# Patient Record
Sex: FEMALE | Race: WHITE | NOT HISPANIC OR LATINO | Employment: OTHER | URBAN - METROPOLITAN AREA
[De-identification: names, ages, dates, MRNs, and addresses within clinical notes are randomized per-mention and may not be internally consistent; named-entity substitution may affect disease eponyms.]

---

## 2017-01-03 ENCOUNTER — ALLSCRIPTS OFFICE VISIT (OUTPATIENT)
Dept: OTHER | Facility: OTHER | Age: 77
End: 2017-01-03

## 2017-07-12 ENCOUNTER — ALLSCRIPTS OFFICE VISIT (OUTPATIENT)
Dept: OTHER | Facility: OTHER | Age: 77
End: 2017-07-12

## 2017-12-13 ENCOUNTER — LAB REQUISITION (OUTPATIENT)
Dept: LAB | Facility: HOSPITAL | Age: 77
End: 2017-12-13
Payer: MEDICARE

## 2017-12-13 ENCOUNTER — ALLSCRIPTS OFFICE VISIT (OUTPATIENT)
Dept: OTHER | Facility: OTHER | Age: 77
End: 2017-12-13

## 2017-12-13 DIAGNOSIS — Z13.820 ENCOUNTER FOR SCREENING FOR OSTEOPOROSIS: ICD-10-CM

## 2017-12-13 DIAGNOSIS — Z01.419 ENCOUNTER FOR GYNECOLOGICAL EXAMINATION WITHOUT ABNORMAL FINDING: ICD-10-CM

## 2017-12-13 PROCEDURE — G0145 SCR C/V CYTO,THINLAYER,RESCR: HCPCS | Performed by: OBSTETRICS & GYNECOLOGY

## 2017-12-15 NOTE — PROGRESS NOTES
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)   · 2009; recurred 2016   3  Osteopenia (733 90) (M85 80)   4  Encounter for routine gynecological examination with Papanicolaou smear of cervix (V72 31,V76 2) (Z01 419)    Plan  Osteoporosis screening    · * DXA BONE DENSITY SPINE HIP AND PELVIS; Status:Hold For - Scheduling;Requested for:89Gyv2832;    Perform:[facility]; Due:65Pkh5894; Ordered;For:Osteoporosis screening; Ordered By:Patriarco, [place];    Discussion/Summary  Pap test was done today Breast cancer screening: breast cancer screening is managed by Dr Ezequiel Marinelli  Colorectal cancer screening: colorectal cancer screening is current  Osteoporosis screening: bone mineral density testing has been ordered  Chief Complaint  Patient presents today for her yearly exam       History of Present Illness  HPI: Hx Rt breast Ca x 2   S/P Rt mastectomyHx CIS Cx many years ago  Paps since then all normalNo c/o   GYN HM, Adult Female [facility]: The patient is being seen for a gynecology evaluation  The last health maintenance visit was 2 year(s) ago  General Health:  Reproductive health: the patient is postmenopausal    Screening:      Review of Systems   Constitutional: No fever, no chills, feels well, no tiredness, no recent weight gain or loss  Breasts: as noted in HPI,-- no breast pain,-- no breast swelling,-- no reddening of the breast,-- no breast lump,-- no breast itching-- and-- no nipple discharge  Gastrointestinal: no complaints of abdominal pain, no constipation, no nausea or diarrhea, no vomiting, no bloody stools  Genitourinary: no complaints of dysuria, no incontinence, no pelvic pain, no dysmenorrhea, no vaginal discharge or abnormal vaginal bleeding  Active Problems  1  Allergic rhinitis (477 9) (J30 9)   2  Breast neoplasm, Tis (DCIS), 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)   3  CIS (carcinoma in situ of cervix) (233 1) (D06 9)   4  Encounter for routine gynecological examination with Papanicolaou smear of cervix (V72 31,V76 2) (Z01 419)   5  Encounter for screening for malignant neoplasm of cervix (V76 2) (Z12 4)   6  Encounter for screening mammogram for malignant neoplasm of breast (V76 12) (Z12 31)   7  Menopausal symptoms (627 2) (N95 1)   8  Osteopenia (733 90) (M85 80)   9   Osteoporosis screening (V82 81) (Z13 820)    Past Medical History   · History of Age At First Period 14 Years Old (Menarche)   · Breast neoplasm, Tis (DCIS), 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)   · History of Currently Wearing Eyeglasses   · History of heartburn (V12 79) (Z87 898)   · History of hypertension (V12 59) (Z86 79)   · History of kidney cancer (V10 52) (Z85 528)   · History of pregnancy (V13 29)   · History of sarcoidosis (V12 29) (Z86 2)   · History of Microcalcifications of the breast (793 81) (R92 0)   · History of Summary Of Previous Pregnancies  3  (Total No )   · History of Summary Of Previous Pregnancies Para 3  (Deliveries)    Surgical History   · History of Breast Reconstruction With Tissue Expander Right Breast   · History of Breast Surgery Mastectomy   · History of Bx Breast Percutan Needle Core Use Imag Guide (Stereotactic)   · History of Cervical Conization   · History of Cholecystectomy   · History of Colon Surgery   · History of Complete Colonoscopy   · History of Deep Axillary Lymph Node Biopsy   · History of Foot Surgery   · History of Knee Replacement   · History of Knee Surgery   · History of Nephrectomy   · History of Removal Of Tissue Expander (Without Replacement)   · History of Right Breast Lumpectomy   · History of [place] Lymph Node Biopsy    Family History  Mother    · Family history of Breast Cancer (V16 3)  Family History    · Family history of Diabetes Mellitus (V18 0)   · Family history of Hypertension (V17 49)    Social History   · Being A Social Drinker   · Marital History - Currently    · Never A Smoker    Current Meds 1  Cetirizine HCl - 10 MG Oral Tablet; Therapy: (Recorded:20Bmu2189) to Recorded   2  HM Vitamin B6 TABS; Therapy: (Recorded:83Pnr2911) to Recorded   3  Januvia 25 MG Oral Tablet; Therapy: (Recorded:63Bcr2400) to Recorded   4  Lisinopril 20 MG Oral Tablet; Therapy: 75AFW8065 to (Last Rx:10Aug2011)  Requested for: 56Qqa3481 Ordered   5  OneTouch Ultra Blue In [employer]; Therapy: (Recorded:57Zaf9550) to Recorded   6  Simvastatin 80 MG Oral Tablet; Therapy: 29Qsa1944 to (Last Lauraine Horde)  Requested for: 03Qlv9115 Ordered   7  Zantac 150 MG Oral Tablet; Therapy: (Recorded:62Myj5796) to Recorded   8  Zolpidem Tartrate 5 MG Oral Tablet Recorded    Allergies  1  Amoxicillin CAPS   2  Azithromycin TABS   3  Bactrim TABS   4  Cipro SOLN   5  MetroNIDAZOLE TABS   6  Promethazine-Codeine SYRP   7  Clindamycin   8  Penicillins    Vitals   Recorded: 71WQM0002 76:11QA   Systolic 791   Diastolic 70   Height 5 ft 6 in   Weight 154 lb 2 oz   BMI Calculated 24 88   BSA Calculated 1 79     Physical Exam   Constitutional  General appearance: No acute distress, well appearing and well nourished  Neck  Neck: Normal, supple, trachea midline, no masses  Thyroid: Normal, no thyromegaly  Pulmonary  Respiratory effort: No increased work of breathing or signs of respiratory distress  Auscultation of lungs: Clear to auscultation  Cardiovascular  Auscultation of heart: Normal rate and rhythm, normal S1 and S2, no murmurs  Peripheral vascular exam: Normal pulses Throughout  Genitourinary  External genitalia: Normal and no lesions appreciated  Vagina: Normal, no lesions or dryness appreciated  Urethra: Normal    Urethral meatus: Normal    Bladder: Normal, soft, non-tender and no prolapse or masses appreciated  Cervix: Normal, no palpable masses  Uterus: Normal, non-tender, not enlarged, and no palpable masses  Adnexa/parametria: Normal, non-tender and no fullness or masses appreciated     Chest  Breasts: Abnormal   Right breast: total mastectomy noted-- and-- mastectomy scar  Examination of the left nipple revealed normal appearance-- and-- no discharge  Left Breast: No masses palpated  Normal axillary node palpated on the right  Normal axillary node palpated on the left  Abdomen  Abdomen: Normal, non-tender, and no organomegaly noted  Liver and spleen: No hepatomegaly or splenomegaly  Examination for hernias: No hernias appreciated  Lymphatic  Palpation of lymph nodes in neck, axillae, groin and/or other locations: No lymphadenopathy or masses noted  Psychiatric  Orientation to person, place, and time: Normal    Mood and affect: Normal        Future Appointments    Date/Time Provider Specialty Site   01/10/2018 09:00 AM LIOR Cohen   Surgical Oncology [facility]   05/25/2018 08:30 AM Marquez Jacob MD Urology [address]     Signatures   Electronically signed by : Segun Brenner DO; Dec 13 2017  3:42PM EST                       (Author) WDL

## 2017-12-20 LAB
LAB AP GYN PRIMARY INTERPRETATION: NORMAL
Lab: NORMAL

## 2018-01-03 ENCOUNTER — GENERIC CONVERSION - ENCOUNTER (OUTPATIENT)
Dept: OTHER | Facility: OTHER | Age: 78
End: 2018-01-03

## 2018-01-03 DIAGNOSIS — D05.11 INTRADUCTAL CARCINOMA IN SITU OF RIGHT BREAST: ICD-10-CM

## 2018-01-05 ENCOUNTER — GENERIC CONVERSION - ENCOUNTER (OUTPATIENT)
Dept: GYNECOLOGY | Facility: CLINIC | Age: 78
End: 2018-01-05

## 2018-01-08 ENCOUNTER — ALLSCRIPTS OFFICE VISIT (OUTPATIENT)
Dept: OTHER | Facility: OTHER | Age: 78
End: 2018-01-08

## 2018-01-09 NOTE — PROGRESS NOTES
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)    Plan   Breast neoplasm, Tis (DCIS), right    · Follow-up visit in 6 months Evaluation and Treatment  Follow-up  Status: Complete     Done: 01SHM2795   Ordered; For: Breast neoplasm, Tis (DCIS), right; Ordered By: Peri Perdue Performed:  Due: 44SQQ1906; Last Updated By: Vanna Parsons; 2018 11:46:36 AM    Discussion/Summary   66 yo female s/p right completion mastectomy after recurrent DCIS  SULAIMAN based on exam today or recent contralateral mammogram  I will therefore see her again in six months or sooner should the need arise  Chief Complaint   Chief Complaint Free Text Note Form: Pt is here for her 6 month breast follow-up  new complaint at this time  imagin/3/18 left 3d scrn mammo (B2)  George and Dr Marvin Cardoso  History of Present Illness   Diagnosis and Staging: DCIS right breast, ER/HI positive- in     Treatment History: 09 right partial mastectomy  hormonal therapy right lumpectomy-recurrent DCIS right mastectomy, expander placement-Sean Hamilton    Current Therapy: none    Disease Status: sulaiman    Interval History: none      Review of Systems   Complete Female ROS SurgOnc:      Constitutional: The patient denies new or recent history of general fatigue, no recent weight loss, no change in appetite  Eyes: No complaints of visual problems, no scleral icterus  ENT: no complaints of ear pain, no hoarseness, no difficulty swallowing,-- no tinnitus-- and-- no new masses in head, oral cavity, or neck  Cardiovascular: No complaints of chest pain, no palpitations, no ankle edema  Respiratory: No complaints of shortness of breath, no cough  Gastrointestinal: No complaints of jaundice, no bloody stools, no pale stools  Genitourinary: No complaints of dysuria, no hematuria, no nocturia, no frequent urination, no urethral discharge        Musculoskeletal: No complaints of weakness, paralysis, joint stiffness or arthralgias,  Integumentary: No complaints of rash, no new lesions  Neurological: No complaints of convulsions, no seizures, no dizziness  Hematologic/Lymphatic: No complaints of easy bruising  Active Problems   1  Allergic rhinitis (477 9) (J30 9)   2  Breast neoplasm, Tis (DCIS), 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)   3  CIS (carcinoma in situ of cervix) (233 1) (D06 9)   4  Encounter for routine gynecological examination with Papanicolaou smear of cervix     (V72 31,V76 2) (Z01 419)   5  Encounter for screening for malignant neoplasm of cervix (V76 2) (Z12 4)   6  Encounter for screening mammogram for malignant neoplasm of breast (V76 12)     (Z12 31)   7  Menopausal symptoms (627 2) (N95 1)   8  Osteopenia (733 90) (M85 80)   9  Osteoporosis screening (V82 81) (F83 035)    Past Medical History   1  History of Age At First Period 14 Years Old (Menarche)   2  Breast neoplasm, Tis (DCIS), 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)   3  History of C  difficile diarrhea (008 45) (A04 72)   4  History of Currently Wearing Eyeglasses   5  History of heartburn (V12 79) (Z87 898)   6  History of hypertension (V12 59) (Z86 79)   7  History of kidney cancer (V10 52) (Z85 528)   8  History of pregnancy (V13 29)   9  History of sarcoidosis (V12 29) (Z86 2)   10  History of Microcalcifications of the breast (793 81) (R92 0)   11  History of Summary Of Previous Pregnancies  3  (Total No )   12  History of Summary Of Previous Pregnancies Para 3  (Deliveries)    Surgical History   1  History of Breast Reconstruction With Tissue Expander Right Breast   · removed secondary to complications   2  History of Breast Surgery Mastectomy   · 16   3  History of Bx Breast Percutan Needle Core Use Imag Guide (Stereotactic)   · 08, right   4  History of Cervical Conization   · 3/   5  History of Cholecystectomy   6  History of Colon Surgery   7  History of Complete Colonoscopy   ·    8   History of Deep Axillary Lymph Node Biopsy   · 12/30/10 secondary to abnormal PEM-benign   9  History of Foot Surgery   10  History of Knee Replacement   · RIGHT KNEE   11  History of Knee Surgery   12  History of Nephrectomy   · stage I carcinoma   13  History of Removal Of Tissue Expander (Without Replacement)   14  History of Right Breast Lumpectomy   · Jan '09, 1/8/16 for recurrence   15  History of [place] Lymph Node Biopsy  Surgical History Reviewed: The surgical history was reviewed and updated today  Family History   Mother    1  Family history of Breast Cancer (V16 3)  Family History    2  Family history of Diabetes Mellitus (V18 0)   3  Family history of Hypertension (V17 49)  Family History Reviewed: The family history was reviewed and updated today  Social History    · Being A Social Drinker   · Marital History - Currently    · Never A Smoker  Social History Reviewed: The social history was reviewed and updated today  The social history was reviewed and is unchanged  Current Meds    1  Cetirizine HCl - 10 MG Oral Tablet; Therapy: (Recorded:52Gwp3834) to Recorded   2  HM Vitamin B6 TABS; Therapy: (Recorded:93Ieq2779) to Recorded   3  Januvia 25 MG Oral Tablet; Therapy: (Recorded:49Xar0012) to Recorded   4  Lisinopril 20 MG Oral Tablet; Therapy: 29MDS2895 to (Last Rx:20Opi5791)  Requested for: 64Rom1381 Ordered   5  OneTouch Ultra Blue In [employer]; Therapy: (Recorded:21Ebu8608) to Recorded   6  Simvastatin 80 MG Oral Tablet; Therapy: 32Pjl5044 to (Last Iman Frye)  Requested for: 57Jzg2942 Ordered   7  Zantac 150 MG Oral Tablet; Therapy: (Recorded:04Agx5389) to Recorded   8  Zolpidem Tartrate 5 MG Oral Tablet Recorded  Medication List Reviewed: The medication list was reviewed and updated today  Allergies   1  Amoxicillin CAPS   2  Azithromycin TABS   3  Bactrim TABS   4  Cipro SOLN   5  MetroNIDAZOLE TABS   6  Promethazine-Codeine SYRP   7  Clindamycin   8  Penicillins    Vitals   Vital Signs    Recorded: 59KGN2821 11:17AM   Temperature 96 9 F   Heart Rate 78   Respiration 14   Systolic 253   Diastolic 72   Height 5 ft 6 in   Weight 154 lb    BMI Calculated 24 86   BSA Calculated 1 79   O2 Saturation 96     Physical Exam        Constitutional: General appearance: The Patient is well-developed, well-nourished female who appears her stated age in no acute distress  She is pleasant and talkative  Chest: The lungs are clear to auscultation  Cardiac: Auscultation of heart: Abnormal   The heart rate was tachycardic  Abdomen: There is no evidence of hepatosplenomegaly  Extremities: There is no edema  Neuro: Orientation to person, place and time: Normal  -- Mood and affect: Normal        Lymphatic: no evidence of cervical adenopathy bilaterally  -- no evidence of axillary adenopathy bilaterally  Skin: Examination of Breast: Abnormal   Breast: Examination of the right breast revealed a total mastectomy-- and-- a mastectomy scar, but-- no breast reconstruction  Examination of the left breast revealed no erythema,-- no swelling-- and-- no tenderness  The left nipple was not inverted  No discharge was noted from the left nipple  No mass was palpable in the left breast  No chest wall masses or skin nodules on the right side  Axillary nodes: no enlarged nodes  Results/Data   Diagnostic Studies Reviewed Surg Onc:      X-ray Review 1/3/18 left 3d screening mammogram benign  Future Appointments      Date/Time Provider Specialty Site   05/25/2018 08:30 AM Shilpa Monaco MD Urology [address]     End of Encounter Meds   1  Cetirizine HCl - 10 MG Oral Tablet; Therapy: (Recorded:75Lnv2615) to Recorded  2  HM Vitamin B6 TABS; Therapy: (Recorded:19Pnx5175) to Recorded   3  Januvia 25 MG Oral Tablet; Therapy: (Recorded:98Hvb4348) to Recorded   4  Lisinopril 20 MG Oral Tablet;      Therapy: 53QUR0589 to (Last Rx:10Aug2011)  Requested for: 10Aug2011 Ordered   5  OneTouch Ultra Blue In [employer]; Therapy: (Recorded:64Igr0035) to Recorded   6  Simvastatin 80 MG Oral Tablet; Therapy: 77Hgf9289 to (Last Aleene Davis)  Requested for: 23Aug2011 Ordered   7  Zantac 150 MG Oral Tablet (RaNITidine HCl); Therapy: (Recorded:91Blv1786) to Recorded   8   Zolpidem Tartrate 5 MG Oral Tablet Recorded    Signatures    Electronically signed by : LIOR Booker ; Jan 8 2018 12:10PM EST                       (Author)

## 2018-01-11 NOTE — MISCELLANEOUS
Message  Contacted pt for post operative follow up  She shares she is experiencing moderate amount of discomfort at her surgical site  Discussed the use of pain medications with her  Her incision line is dry and intact  She has one TENISHA drain which she is caring for without difficulty  She shares the visiting nurse will be seeing her today  She has follow up appt scheduled with Dr Dani Gonzalez  Active Problems    1  Allergic rhinitis (477 9) (J30 9)   2  Carcinoma in situ of breast, unspecified laterality (233 0) (D05 90)   3  CIS (carcinoma in situ of cervix) (233 1) (D06 9)   4  Encounter for routine gynecological examination with Papanicolaou smear of cervix   (V72 31,V76 2) (Z01 419)   5  Encounter for screening for malignant neoplasm of cervix (V76 2) (Z12 4)   6  Menopausal symptoms (627 2) (N95 1)   7  Osteopenia (733 90) (M85 80)   8  Osteoporosis screening (V82 81) (Z13 820)   9  Screening mammogram for high-risk patient (V76 11) (Z12 31)    Current Meds   1  Cetirizine HCl - 10 MG Oral Tablet; Therapy: (Recorded:63Ipl7306) to Recorded   2  Cholestyramine Susp Light 4 GM PACK; Therapy: (Recorded:19Dea8382) to Recorded   3  Lisinopril 20 MG Oral Tablet; Therapy: 66RXH4705 to (Last Rx:99Iiw6084)  Requested for: 76Ofd9148 Ordered   4  OneTouch Ultra Blue In [employer]; Therapy: (Recorded:31Jia3477) to Recorded   5  Pantoprazole Sodium 40 MG Oral Tablet Delayed Release; Therapy: 95Dfg2643 to (Last Rx:78Cwv8121)  Requested for: 01Evt4112 Ordered   6  Simvastatin 80 MG Oral Tablet; Therapy: 18Wlv8611 to (Last Lazarus Merchant)  Requested for: 28Kcc4957 Ordered   7  Zolpidem Tartrate 5 MG Oral Tablet Recorded    Allergies    1  Amoxicillin CAPS   2  Azithromycin TABS   3  Bactrim TABS   4  Cipro SOLN   5  MetroNIDAZOLE TABS   6  Promethazine-Codeine SYRP   7  Penicillins    Signatures   Electronically signed by :  Dasha Martínez, ; Mar 11 2016 10:03AM EST                       (Author)

## 2018-01-13 VITALS
RESPIRATION RATE: 13 BRPM | SYSTOLIC BLOOD PRESSURE: 118 MMHG | BODY MASS INDEX: 25.39 KG/M2 | TEMPERATURE: 97.5 F | HEART RATE: 86 BPM | HEIGHT: 66 IN | WEIGHT: 158 LBS | OXYGEN SATURATION: 97 % | DIASTOLIC BLOOD PRESSURE: 78 MMHG

## 2018-01-14 VITALS
HEIGHT: 66 IN | SYSTOLIC BLOOD PRESSURE: 140 MMHG | DIASTOLIC BLOOD PRESSURE: 80 MMHG | BODY MASS INDEX: 25.39 KG/M2 | WEIGHT: 158 LBS | TEMPERATURE: 96.7 F | HEART RATE: 82 BPM | OXYGEN SATURATION: 96 % | RESPIRATION RATE: 14 BRPM

## 2018-01-15 ENCOUNTER — GENERIC CONVERSION - ENCOUNTER (OUTPATIENT)
Dept: GYNECOLOGY | Facility: CLINIC | Age: 78
End: 2018-01-15

## 2018-01-22 VITALS
TEMPERATURE: 96.9 F | SYSTOLIC BLOOD PRESSURE: 134 MMHG | WEIGHT: 154 LBS | DIASTOLIC BLOOD PRESSURE: 72 MMHG | HEIGHT: 66 IN | RESPIRATION RATE: 14 BRPM | BODY MASS INDEX: 24.75 KG/M2 | HEART RATE: 78 BPM | OXYGEN SATURATION: 96 %

## 2018-01-23 VITALS
BODY MASS INDEX: 24.77 KG/M2 | HEIGHT: 66 IN | SYSTOLIC BLOOD PRESSURE: 136 MMHG | WEIGHT: 154.13 LBS | DIASTOLIC BLOOD PRESSURE: 70 MMHG

## 2018-05-25 ENCOUNTER — OFFICE VISIT (OUTPATIENT)
Dept: UROLOGY | Facility: MEDICAL CENTER | Age: 78
End: 2018-05-25
Payer: MEDICARE

## 2018-05-25 VITALS
HEIGHT: 68 IN | DIASTOLIC BLOOD PRESSURE: 76 MMHG | BODY MASS INDEX: 23.79 KG/M2 | WEIGHT: 157 LBS | SYSTOLIC BLOOD PRESSURE: 110 MMHG

## 2018-05-25 DIAGNOSIS — R31.29 MICROSCOPIC HEMATURIA: ICD-10-CM

## 2018-05-25 DIAGNOSIS — Z85.528 PERSONAL HISTORY OF OTHER MALIGNANT NEOPLASM OF KIDNEY: Primary | ICD-10-CM

## 2018-05-25 DIAGNOSIS — Z90.5 HISTORY OF PARTIAL NEPHRECTOMY: ICD-10-CM

## 2018-05-25 LAB
SL AMB  POCT GLUCOSE, UA: ABNORMAL
SL AMB LEUKOCYTE ESTERASE,UA: ABNORMAL
SL AMB POCT BILIRUBIN,UA: ABNORMAL
SL AMB POCT BLOOD,UA: ABNORMAL
SL AMB POCT CLARITY,UA: CLEAR
SL AMB POCT COLOR,UA: YELLOW
SL AMB POCT KETONES,UA: ABNORMAL
SL AMB POCT NITRITE,UA: ABNORMAL
SL AMB POCT PH,UA: 6.5
SL AMB POCT SPECIFIC GRAVITY,UA: 1.01
SL AMB POCT URINE PROTEIN: ABNORMAL
SL AMB POCT UROBILINOGEN: 0.2

## 2018-05-25 PROCEDURE — 99214 OFFICE O/P EST MOD 30 MIN: CPT | Performed by: UROLOGY

## 2018-05-25 PROCEDURE — 81003 URINALYSIS AUTO W/O SCOPE: CPT | Performed by: UROLOGY

## 2018-05-25 RX ORDER — PYRIDOXINE HCL (VITAMIN B6) 100 MG
100 TABLET ORAL
COMMUNITY

## 2018-05-25 RX ORDER — RANITIDINE 300 MG/1
TABLET ORAL
COMMUNITY
Start: 2018-03-15 | End: 2020-06-09 | Stop reason: ALTCHOICE

## 2018-05-30 ENCOUNTER — HOSPITAL ENCOUNTER (OUTPATIENT)
Dept: ULTRASOUND IMAGING | Facility: MEDICAL CENTER | Age: 78
Discharge: HOME | End: 2018-05-30
Attending: UROLOGY
Payer: MEDICARE

## 2018-05-30 DIAGNOSIS — Z90.5 HISTORY OF PARTIAL NEPHRECTOMY: ICD-10-CM

## 2018-05-30 DIAGNOSIS — Z85.528 PERSONAL HISTORY OF OTHER MALIGNANT NEOPLASM OF KIDNEY: ICD-10-CM

## 2018-05-30 PROCEDURE — 76770 US EXAM ABDO BACK WALL COMP: CPT

## 2018-06-19 ENCOUNTER — TELEPHONE (OUTPATIENT)
Dept: UROLOGY | Facility: AMBULATORY SURGERY CENTER | Age: 78
End: 2018-06-19

## 2018-06-19 NOTE — TELEPHONE ENCOUNTER
Patient would like a return phone call with her 05/30/18 Ultrasound results  Please call her at [phone]

## 2018-09-05 ENCOUNTER — OFFICE VISIT (OUTPATIENT)
Dept: SURGICAL ONCOLOGY | Facility: CLINIC | Age: 78
End: 2018-09-05
Payer: MEDICARE

## 2018-09-05 VITALS
RESPIRATION RATE: 16 BRPM | HEART RATE: 84 BPM | SYSTOLIC BLOOD PRESSURE: 138 MMHG | HEIGHT: 68 IN | TEMPERATURE: 97 F | DIASTOLIC BLOOD PRESSURE: 80 MMHG | WEIGHT: 162 LBS | BODY MASS INDEX: 24.55 KG/M2

## 2018-09-05 DIAGNOSIS — Z12.39 BREAST CANCER SCREENING, HIGH RISK PATIENT: ICD-10-CM

## 2018-09-05 DIAGNOSIS — D05.11 INTRADUCTAL CARCINOMA IN SITU OF RIGHT BREAST: Primary | ICD-10-CM

## 2018-09-05 PROCEDURE — 99214 OFFICE O/P EST MOD 30 MIN: CPT | Performed by: SURGERY

## 2018-09-05 NOTE — PROGRESS NOTES
Surgical Oncology Follow Up       [facility] SURGICAL ONCOLOGY [address] [address]    Karen Waldrop  1940  [id number]  [address]  [facility] SURGICAL ONCOLOGY [address] [address]    Chief Complaint   Patient presents with    Breast Cancer     Pt is here for 6 month follow up        Assessment/Plan   Diagnoses and all orders for this visit:    Intraductal carcinoma in situ of right breast    Breast cancer screening, high risk patient  -     Mammo screening left w 3d & cad; Future        Advance Care Planning/Advance Directives:  Did not discuss  with the patient  Oncology History:       Intraductal carcinoma in situ of right breast     Initial Diagnosis     Intraductal carcinoma in situ of right breast       2009 Surgery     Partial mastectomy         1/8/2016 Surgery     Right breast lumpectomy         3/8/2016 Surgery     Right breast mastectomy, expander reconstruction          Surgery     Removal of breast expander          Radiation     PBRT          Hormone Therapy     declined            History of Present Illness: breast cancer follow up   -Interval History: none    Review of Systems:  Review of Systems   Constitutional: Negative  Negative for appetite change and fever  Eyes: Negative  Respiratory: Negative for shortness of breath  Cardiovascular: Negative  Gastrointestinal: Negative  Endocrine: Negative  Genitourinary: Negative  Musculoskeletal: Negative  Negative for arthralgias and myalgias  Skin: Negative  Allergic/Immunologic: Negative  Neurological: Negative  Hematological: Negative  Negative for adenopathy  Does not bruise/bleed easily  Psychiatric/Behavioral: Negative          Patient Active Problem List   Diagnosis    Intraductal carcinoma in situ of right breast    Breast cancer screening, high risk patient     Past Medical History:   Diagnosis Date  Acid reflux     Allergic rhinitis     C  difficile diarrhea     CIS (carcinoma in situ of cervix)     Diarrhea     Environmental allergies     Heartburn     High cholesterol     Hypertension     Menopausal symptoms     Nausea and vomiting     Osteopenia     Other microscopic hematuria     Personal history of other malignant neoplasm of kidney     Renal mass     Renal/ureteral disease     Sarcoid     UTI (urinary tract infection)     Wears glasses     Wears glasses      Past Surgical History:   Procedure Laterality Date    BREAST LUMPECTOMY Right     BREAST SURGERY Right     lumpectomy    CERVICAL CONIZATION   W/ LASER      CHOLECYSTECTOMY      COLON SURGERY      COLONOSCOPY      DEEP AXILLARY SENTINEL NODE BIOPSY / EXCISION      FOOT SURGERY      INTRAOPERATIVE RADIATION THERAPY (IORT)  2009    JOINT REPLACEMENT Right     knee    PARTIAL NEPHRECTOMY Left     MI BREAST RECONSTRUC W TISS EXPANDR Right 3/8/2016    Procedure: INSERTION/EXPANDER  IMPLANT BREAST ;  Surgeon: Georgette Landa MD;  Location: [facility];  Service: Plastics    MI MASTECTOMY, SIMPLE, COMPLETE Right 3/8/2016    Procedure: MASTECTOMY SIMPLE;  Surgeon: Bobbi Buck MD;  Location: [facility];  Service: General    TOTAL KNEE ARTHROPLASTY Right      Family History   Problem Relation Age of Onset    Alzheimer's disease Mother     Breast cancer Mother         age dx unk    Kidney failure Father     Prostate cancer Brother      Social History     Social History    Marital status: /Civil Union     Spouse name: N/A    Number of children: N/A    Years of education: N/A     Occupational History    Not on file       Social History Main Topics    Smoking status: Former Smoker     Quit date: 2/24/1975    Smokeless tobacco: Never Used    Alcohol use 0 6 oz/week     1 Glasses of wine per week    Drug use: No    Sexual activity: Not on file     Other Topics Concern    Not on file     Social History Narrative  No narrative on file       Current Outpatient Prescriptions:     cetirizine (ZyrTEC) 10 mg tablet, Take 10 mg by mouth daily  , Disp: , Rfl:     lisinopril (ZESTRIL) 20 mg tablet, Take 20 mg by mouth daily  , Disp: , Rfl:     pyridoxine (B-6) 100 MG tablet, Take 100 mg by mouth, Disp: , Rfl:     ranitidine (ZANTAC) 300 MG tablet, , Disp: , Rfl:     simvastatin (ZOCOR) 40 mg tablet, Take 40 mg by mouth daily at bedtime  , Disp: , Rfl:     sitaGLIPtin (JANUVIA) 25 mg tablet, , Disp: , Rfl:     zolpidem (AMBIEN) 5 mg tablet, Take 5 mg by mouth daily at bedtime as needed for sleep , Disp: , Rfl:     Probiotic Product (PROBIOTIC DAILY PO), Take by mouth 2 (two) times a day , Disp: , Rfl:   Allergies   Allergen Reactions    Amoxicillin Other (See Comments)     C-diff    Azithromycin Diarrhea    Ciprofloxacin Other (See Comments)     Unknown    Clindamycin     Metronidazole Other (See Comments)     Nausea and severe headache    Penicillins     Promethazine Other (See Comments)     dizziness    Sulfa Antibiotics     Bactrim [Sulfamethoxazole-Trimethoprim] Rash       The following portions of the patient's history were reviewed and updated as appropriate: allergies, current medications, past family history, past medical history, past social history, past surgical history and problem list         Vitals:    09/05/18 0916   BP: 138/80   Pulse: 84   Resp: 16   Temp: (!) 97 °F (36 1 °C)       Physical Exam   Constitutional: She is oriented to person, place, and time  She appears well-developed and well-nourished  HENT:   Head: Normocephalic and atraumatic  Cardiovascular: Normal heart sounds  Pulmonary/Chest: Breath sounds normal  Right breast exhibits skin change (mastectomy scar)  Right breast exhibits no mass and no tenderness  Left breast exhibits no inverted nipple, no mass, no nipple discharge, no skin change and no tenderness  Abdominal: Soft     Lymphadenopathy:        Right axillary: No pectoral and no lateral adenopathy present  Left axillary: No pectoral and no lateral adenopathy present  Right: No supraclavicular adenopathy present  Left: No supraclavicular adenopathy present  Neurological: She is alert and oriented to person, place, and time  Psychiatric: She has a normal mood and affect  Discussion/Summary:  74-year-old female status post right mastectomy for recurrent DCIS  There is no evidence of disease based on examination today  I will make arrangements for her left-sided mammogram due in January  I will see her again in six months or sooner should the need arise

## 2019-03-06 ENCOUNTER — OFFICE VISIT (OUTPATIENT)
Dept: SURGICAL ONCOLOGY | Facility: CLINIC | Age: 79
End: 2019-03-06
Payer: MEDICARE

## 2019-03-06 VITALS
WEIGHT: 159.2 LBS | SYSTOLIC BLOOD PRESSURE: 140 MMHG | HEART RATE: 80 BPM | DIASTOLIC BLOOD PRESSURE: 84 MMHG | HEIGHT: 68 IN | BODY MASS INDEX: 24.13 KG/M2 | TEMPERATURE: 97.1 F | RESPIRATION RATE: 14 BRPM

## 2019-03-06 DIAGNOSIS — Z85.3 ENCOUNTER FOR FOLLOW-UP SURVEILLANCE OF BREAST CANCER: ICD-10-CM

## 2019-03-06 DIAGNOSIS — Z08 ENCOUNTER FOR FOLLOW-UP SURVEILLANCE OF BREAST CANCER: ICD-10-CM

## 2019-03-06 DIAGNOSIS — Z86.000 PERSONAL HISTORY OF IN-SITU NEOPLASM OF BREAST: Primary | ICD-10-CM

## 2019-03-06 PROCEDURE — 99214 OFFICE O/P EST MOD 30 MIN: CPT | Performed by: SURGERY

## 2019-03-06 NOTE — PROGRESS NOTES
Surgical Oncology Follow Up       [facility]  [facility]Y [address] [address]    Ganga Hayes  1940  [id number]  [address]  CANCER [facility] SURGICAL ONCOLOGY [place]  [place]    Chief Complaint   Patient presents with    Follow-up     6 month        Assessment/Plan   Diagnoses and all orders for this visit:    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    Encounter for follow-up surveillance of breast cancer          Advance Care Planning/Advance Directives:  Discussed disease status, cancer treatment plans and/or cancer treatment goals with the patient  Oncology History:       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     Initial Diagnosis     Intraductal carcinoma in situ of right breast         2009 Surgery     Partial mastectomy         1/8/2016 Surgery     Right breast lumpectomy         3/8/2016 Surgery     Right breast mastectomy, expander reconstruction          Surgery     Removal of breast expander          Radiation     PBRT          Hormone Therapy     declined            History of Present Illness:  Breast cancer follow-up  -Interval History:  None    Review of Systems:  Review of Systems   Constitutional: Negative  Negative for appetite change and fever  Eyes: Negative  Respiratory: Negative for shortness of breath  Cardiovascular: Negative  Gastrointestinal: Negative  Endocrine: Negative  Genitourinary: Negative  Musculoskeletal: Negative  Negative for arthralgias and myalgias  Skin: Negative  Allergic/Immunologic: Negative  Neurological: Negative  Hematological: Negative  Negative for adenopathy  Does not bruise/bleed easily  Psychiatric/Behavioral: Negative          Patient Active Problem List   Diagnosis    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    Breast cancer screening, high risk patient    Encounter for follow-up surveillance of breast cancer     Past Medical History:   Diagnosis Date    Acid reflux     Allergic rhinitis     C  difficile diarrhea     CIS (carcinoma in situ of cervix)     Diarrhea     Environmental allergies     Heartburn     High cholesterol     Hypertension     Menopausal symptoms     Nausea and vomiting     Osteopenia     Other microscopic hematuria     Personal history of other malignant neoplasm of kidney     Renal mass     Renal/ureteral disease     Sarcoid     UTI (urinary tract infection)     Wears glasses     Wears glasses      Past Surgical History:   Procedure Laterality Date    BREAST LUMPECTOMY Right     BREAST SURGERY Right     lumpectomy    CERVICAL CONIZATION   W/ LASER      CHOLECYSTECTOMY      COLON SURGERY      COLONOSCOPY      DEEP AXILLARY SENTINEL NODE BIOPSY / EXCISION      FOOT SURGERY      INTRAOPERATIVE RADIATION THERAPY (IORT)  2009    JOINT REPLACEMENT Right     knee    PARTIAL NEPHRECTOMY Left     ME BREAST RECONSTRUC W TISS EXPANDR Right 3/8/2016    Procedure: INSERTION/EXPANDER  IMPLANT BREAST ;  Surgeon: Aramis Pérez MD;  [facility];  Service: Plastics    ME MASTECTOMY, SIMPLE, COMPLETE Right 3/8/2016    Procedure: MASTECTOMY SIMPLE;  Surgeon: Radha Ferro MD;  [facility];  Service: General    TOTAL KNEE ARTHROPLASTY Right      Family History   Problem Relation Age of Onset    Alzheimer's disease Mother     Breast cancer Mother         age dx unk    Kidney failure Father     Prostate cancer Brother      Social History     Socioeconomic History    Marital status: /Civil Union     Spouse name: Not on file    Number of children: Not on file    Years of education: Not on file    Highest education level: Not on file   Occupational History    Not on file   Social Needs    Financial resource strain: Not on file    Food insecurity:     Worry: Not on file     Inability: Not on file    Transportation needs: Medical: Not on file     Non-medical: Not on file   Tobacco Use    Smoking status: Former Smoker     Last attempt to quit: 1975     Years since quittin 0    Smokeless tobacco: Never Used   Substance and Sexual Activity    Alcohol use: Yes     Alcohol/week: 0 6 oz     Types: 1 Glasses of wine per week    Drug use: No    Sexual activity: Not on file   Lifestyle    Physical activity:     Days per week: Not on file     Minutes per session: Not on file    Stress: Not on file   Relationships    Social connections:     Talks on phone: Not on file     Gets together: Not on file     Attends Jain service: Not on file     Active member of club or organization: Not on file     Attends meetings of clubs or organizations: Not on file     Relationship status: Not on file    Intimate partner violence:     Fear of current or ex partner: Not on file     Emotionally abused: Not on file     Physically abused: Not on file     Forced sexual activity: Not on file   Other Topics Concern    Not on file   Social History Narrative    Not on file       Current Outpatient Medications:     cetirizine (ZyrTEC) 10 mg tablet, Take 10 mg by mouth daily  , Disp: , Rfl:     CHOLESTYRAMINE PO, cholestyramine (with sugar) 4 gram powder for susp in a packet, Disp: , Rfl:     lisinopril (ZESTRIL) 20 mg tablet, Take 20 mg by mouth daily  , Disp: , Rfl:     pyridoxine (B-6) 100 MG tablet, Take 100 mg by mouth, Disp: , Rfl:     ranitidine (ZANTAC) 300 MG tablet, , Disp: , Rfl:     simvastatin (ZOCOR) 40 mg tablet, Take 40 mg by mouth daily at bedtime  , Disp: , Rfl:     sitaGLIPtin (JANUVIA) 25 mg tablet, , Disp: , Rfl:     zolpidem (AMBIEN) 5 mg tablet, Take 5 mg by mouth daily at bedtime as needed for sleep , Disp: , Rfl:     Probiotic Product (PROBIOTIC DAILY PO), Take by mouth 2 (two) times a day , Disp: , Rfl:   Allergies   Allergen Reactions    Amoxicillin Other (See Comments)     C-diff    Azithromycin Diarrhea    Ciprofloxacin Other (See Comments)     Unknown    Clindamycin     Metronidazole Other (See Comments)     Nausea and severe headache    Penicillins     Promethazine Other (See Comments)     dizziness    Sulfa Antibiotics     Bactrim [Sulfamethoxazole-Trimethoprim] Rash       The following portions of the patient's history were reviewed and updated as appropriate: allergies, current medications, past family history, past medical history, past social history, past surgical history and problem list         Vitals:    03/06/19 0933   BP: 140/84   Pulse: 80   Resp: 14   Temp: (!) 97 1 °F (36 2 °C)       Physical Exam   Constitutional: She is oriented to person, place, and time  She appears well-developed and well-nourished  HENT:   Head: Normocephalic and atraumatic  Cardiovascular: Normal heart sounds  Pulmonary/Chest: Breath sounds normal  Right breast exhibits skin change (Mastectomy scar)  Right breast exhibits no mass and no tenderness  Left breast exhibits no inverted nipple, no mass, no nipple discharge, no skin change and no tenderness  Abdominal: Soft  Lymphadenopathy:        Right axillary: No pectoral and no lateral adenopathy present  Left axillary: No pectoral and no lateral adenopathy present  Right: No supraclavicular adenopathy present  Left: No supraclavicular adenopathy present  Neurological: She is alert and oriented to person, place, and time  Psychiatric: She has a normal mood and affect  Imaging  01/04/2019 left 3D screening mammogram is benign BI-RADS two with a density of two    I reviewed the above imaging data  Discussion/Summary:  76-year-old female who originally had breast conservation in 2009  She then had recurrent disease in 2016  She had a completion mastectomy at that time as well as expander placed  She subsequently had the expander removed  There is no evidence of disease based on examination today    Her recent contralateral mammogram is benign  I will see her again in six months for another exam or sooner should the need arise

## 2019-06-05 ENCOUNTER — OFFICE VISIT (OUTPATIENT)
Dept: UROLOGY | Facility: MEDICAL CENTER | Age: 79
End: 2019-06-05
Payer: MEDICARE

## 2019-06-05 VITALS
DIASTOLIC BLOOD PRESSURE: 80 MMHG | HEIGHT: 68 IN | HEART RATE: 77 BPM | BODY MASS INDEX: 23.79 KG/M2 | WEIGHT: 157 LBS | SYSTOLIC BLOOD PRESSURE: 110 MMHG

## 2019-06-05 DIAGNOSIS — Z85.528 PERSONAL HISTORY OF OTHER MALIGNANT NEOPLASM OF KIDNEY: Primary | ICD-10-CM

## 2019-06-05 DIAGNOSIS — Z98.890 S/P ROBOT-ASSISTED SURGICAL PROCEDURE: ICD-10-CM

## 2019-06-05 DIAGNOSIS — Z90.5 HISTORY OF PARTIAL NEPHRECTOMY: ICD-10-CM

## 2019-06-05 LAB
SL AMB  POCT GLUCOSE, UA: ABNORMAL
SL AMB LEUKOCYTE ESTERASE,UA: ABNORMAL
SL AMB POCT BILIRUBIN,UA: ABNORMAL
SL AMB POCT BLOOD,UA: ABNORMAL
SL AMB POCT CLARITY,UA: CLEAR
SL AMB POCT COLOR,UA: ABNORMAL
SL AMB POCT KETONES,UA: ABNORMAL
SL AMB POCT NITRITE,UA: ABNORMAL
SL AMB POCT PH,UA: 5.5
SL AMB POCT SPECIFIC GRAVITY,UA: 1.01
SL AMB POCT URINE PROTEIN: ABNORMAL
SL AMB POCT UROBILINOGEN: 0.2

## 2019-06-05 PROCEDURE — 99214 OFFICE O/P EST MOD 30 MIN: CPT | Performed by: UROLOGY

## 2019-06-05 PROCEDURE — 81003 URINALYSIS AUTO W/O SCOPE: CPT | Performed by: UROLOGY

## 2019-06-05 RX ORDER — ASPIRIN 81 MG/1
TABLET ORAL
COMMUNITY
End: 2019-06-05 | Stop reason: ALTCHOICE

## 2019-06-05 RX ORDER — PANTOPRAZOLE SODIUM 40 MG/1
TABLET, DELAYED RELEASE ORAL
COMMUNITY
End: 2019-06-05 | Stop reason: ALTCHOICE

## 2019-06-05 RX ORDER — HYDROCODONE BITARTRATE AND ACETAMINOPHEN 5; 300 MG/1; MG/1
TABLET ORAL
COMMUNITY
End: 2019-06-05 | Stop reason: ALTCHOICE

## 2019-06-05 RX ORDER — AZITHROMYCIN 500 MG/1
TABLET, FILM COATED ORAL
COMMUNITY
End: 2019-06-05 | Stop reason: ALTCHOICE

## 2019-06-05 RX ORDER — AMOXICILLIN AND CLAVULANATE POTASSIUM 875; 125 MG/1; MG/1
TABLET, FILM COATED ORAL
COMMUNITY
End: 2019-06-05 | Stop reason: ALTCHOICE

## 2019-06-05 RX ORDER — FLUTICASONE PROPIONATE 50 MCG
SPRAY, SUSPENSION (ML) NASAL
COMMUNITY
End: 2019-06-05 | Stop reason: ALTCHOICE

## 2019-06-05 RX ORDER — LISINOPRIL AND HYDROCHLOROTHIAZIDE 12.5; 1 MG/1; MG/1
TABLET ORAL
COMMUNITY
End: 2019-06-05 | Stop reason: ALTCHOICE

## 2019-06-05 RX ORDER — OXYCODONE HYDROCHLORIDE AND ACETAMINOPHEN 5; 325 MG/1; MG/1
TABLET ORAL
COMMUNITY
End: 2019-06-05 | Stop reason: ALTCHOICE

## 2019-06-05 RX ORDER — HYDROCODONE BITARTRATE AND ACETAMINOPHEN 5; 325 MG/1; MG/1
TABLET ORAL
COMMUNITY
End: 2019-06-05 | Stop reason: ALTCHOICE

## 2019-06-05 RX ORDER — ALPRAZOLAM 0.5 MG/1
TABLET ORAL
COMMUNITY

## 2019-06-05 RX ORDER — ERYTHROMYCIN 500 MG/1
TABLET, COATED ORAL
COMMUNITY
End: 2019-06-05 | Stop reason: ALTCHOICE

## 2019-06-05 RX ORDER — NEOMYCIN SULFATE 500 MG/1
TABLET ORAL
COMMUNITY
End: 2019-06-05 | Stop reason: ALTCHOICE

## 2019-06-05 RX ORDER — DOXYCYCLINE HYCLATE 100 MG/1
CAPSULE ORAL
COMMUNITY
End: 2020-06-09 | Stop reason: ALTCHOICE

## 2019-06-05 RX ORDER — SULFAMETHOXAZOLE AND TRIMETHOPRIM 800; 160 MG/1; MG/1
TABLET ORAL
COMMUNITY
End: 2019-06-05 | Stop reason: ALTCHOICE

## 2019-06-10 ENCOUNTER — HOSPITAL ENCOUNTER (OUTPATIENT)
Dept: ULTRASOUND IMAGING | Facility: MEDICAL CENTER | Age: 79
Discharge: HOME | End: 2019-06-10
Attending: UROLOGY
Payer: MEDICARE

## 2019-06-10 DIAGNOSIS — Z90.5 HISTORY OF PARTIAL NEPHRECTOMY: ICD-10-CM

## 2019-06-10 DIAGNOSIS — Z85.528 PERSONAL HISTORY OF OTHER MALIGNANT NEOPLASM OF KIDNEY: ICD-10-CM

## 2019-06-10 PROCEDURE — 76770 US EXAM ABDO BACK WALL COMP: CPT

## 2019-06-19 ENCOUNTER — TELEPHONE (OUTPATIENT)
Dept: UROLOGY | Facility: AMBULATORY SURGERY CENTER | Age: 79
End: 2019-06-19

## 2019-09-04 ENCOUNTER — OFFICE VISIT (OUTPATIENT)
Dept: SURGICAL ONCOLOGY | Facility: CLINIC | Age: 79
End: 2019-09-04
Payer: MEDICARE

## 2019-09-04 VITALS
DIASTOLIC BLOOD PRESSURE: 70 MMHG | SYSTOLIC BLOOD PRESSURE: 118 MMHG | WEIGHT: 158.8 LBS | HEIGHT: 68 IN | TEMPERATURE: 97.4 F | BODY MASS INDEX: 24.07 KG/M2 | RESPIRATION RATE: 18 BRPM | HEART RATE: 78 BPM

## 2019-09-04 DIAGNOSIS — Z86.000 PERSONAL HISTORY OF IN-SITU NEOPLASM OF BREAST: ICD-10-CM

## 2019-09-04 DIAGNOSIS — Z12.39 BREAST CANCER SCREENING, HIGH RISK PATIENT: ICD-10-CM

## 2019-09-04 DIAGNOSIS — Z08 ENCOUNTER FOR FOLLOW-UP SURVEILLANCE OF BREAST CANCER: Primary | ICD-10-CM

## 2019-09-04 DIAGNOSIS — Z85.3 ENCOUNTER FOR FOLLOW-UP SURVEILLANCE OF BREAST CANCER: Primary | ICD-10-CM

## 2019-09-04 PROCEDURE — 99213 OFFICE O/P EST LOW 20 MIN: CPT | Performed by: SURGERY

## 2019-09-04 RX ORDER — PANTOPRAZOLE SODIUM 40 MG/1
TABLET, DELAYED RELEASE ORAL
COMMUNITY

## 2019-09-04 NOTE — PROGRESS NOTES
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    Other orders  -     pantoprazole (PROTONIX) 40 mg tablet; pantoprazole 40 mg tablet,delayed release        Advance Care Planning/Advance Directives:  Discussed disease status, cancer treatment plans and/or cancer treatment goals with the patient  Oncology History:       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     Initial Diagnosis     Intraductal carcinoma in situ of right breast      2009 Surgery     Partial mastectomy      1/8/2016 Surgery     Right breast lumpectomy      3/8/2016 Surgery     Right breast mastectomy, expander reconstruction       Surgery     Removal of breast expander       Radiation     PBRT       Hormone Therapy     declined         History of Present Illness: breast cancer follow up   -Interval History: none    Review of Systems:  Review of Systems   Constitutional: Negative  Negative for appetite change and fever  Eyes: Negative  Respiratory: Negative for shortness of breath  Cardiovascular: Negative  Gastrointestinal: Negative  Endocrine: Negative  Genitourinary: Negative  Musculoskeletal: Negative  Negative for arthralgias and myalgias  Skin: Negative  Allergic/Immunologic: Negative  Neurological: Negative  Hematological: Negative  Negative for adenopathy  Does not bruise/bleed easily  Psychiatric/Behavioral: Negative          Patient Active Problem List   Diagnosis    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    Breast cancer screening, high risk patient    Encounter for follow-up surveillance of breast cancer     Past Medical History:   Diagnosis Date    Acid reflux     Allergic rhinitis     C  difficile diarrhea     CIS (carcinoma in situ of cervix)     Diarrhea     Environmental allergies     Heartburn     High cholesterol     Hypertension     Menopausal symptoms     Nausea and vomiting     Osteopenia     Other microscopic hematuria     Personal history of other malignant neoplasm of kidney     Renal mass     Renal/ureteral disease     Sarcoid     UTI (urinary tract infection)     Wears glasses     Wears glasses      Past Surgical History:   Procedure Laterality Date    BREAST LUMPECTOMY Right     BREAST SURGERY Right     lumpectomy    CERVICAL CONIZATION   W/ LASER      CHOLECYSTECTOMY      COLON SURGERY      COLONOSCOPY      DEEP AXILLARY SENTINEL NODE BIOPSY / EXCISION      FOOT SURGERY      INTRAOPERATIVE RADIATION THERAPY (IORT)  2009    JOINT REPLACEMENT Right     knee    PARTIAL NEPHRECTOMY Left     WY BREAST RECONSTRUC W TISS EXPANDR Right 3/8/2016    Procedure: INSERTION/EXPANDER  IMPLANT BREAST ;  Surgeon: Robbin Ortiz MD;  [facility];  Service: Plastics    WY MASTECTOMY, SIMPLE, COMPLETE Right 3/8/2016    Procedure: MASTECTOMY SIMPLE;  Surgeon: Hiral Arndt MD;  [facility];  Service: General    TOTAL KNEE ARTHROPLASTY Right      Family History   Problem Relation Age of Onset    Alzheimer's disease Mother     Breast cancer Mother         age dx unk    Kidney failure Father     Prostate cancer Brother      Social History     Socioeconomic History    Marital status: /Civil Union     Spouse name: Not on file    Number of children: Not on file    Years of education: Not on file    Highest education level: Not on file   Occupational History    Not on file   Social Needs    Financial resource strain: Not on file    Food insecurity:     Worry: Not on file     Inability: Not on file    Transportation needs:     Medical: Not on file     Non-medical: Not on file   Tobacco Use    Smoking status: Former Smoker     Last attempt to quit: 1975     Years since quittin 5    Smokeless tobacco: Never Used   Substance and Sexual Activity    Alcohol use: Yes     Alcohol/week: 1 0 standard drinks     Types: 1 Glasses of wine per week    Drug use: No    Sexual activity: Not on file   Lifestyle    Physical activity:     Days per week: Not on file     Minutes per session: Not on file    Stress: Not on file   Relationships    Social connections:     Talks on phone: Not on file     Gets together: Not on file     Attends Worship service: Not on file     Active member of club or organization: Not on file     Attends meetings of clubs or organizations: Not on file     Relationship status: Not on file    Intimate partner violence:     Fear of current or ex partner: Not on file     Emotionally abused: Not on file     Physically abused: Not on file     Forced sexual activity: Not on file   Other Topics Concern    Not on file   Social History Narrative    Not on file       Current Outpatient Medications:     ALPRAZolam (XANAX) 0 5 mg tablet, alprazolam 0 5 mg tablet, Disp: , Rfl:     cetirizine (ZyrTEC) 10 mg tablet, Take 10 mg by mouth daily  , Disp: , Rfl:     CHOLESTYRAMINE PO, cholestyramine (with sugar) 4 gram powder for susp in a packet, Disp: , Rfl:     diclofenac sodium (VOLTAREN) 1 %, CATRACHITA 2 GRAMS EXT AA QID, Disp: , Rfl: 5    lisinopril (ZESTRIL) 20 mg tablet, Take 20 mg by mouth daily  , Disp: , Rfl:     pyridoxine (B-6) 100 MG tablet, Take 100 mg by mouth, Disp: , Rfl:     simvastatin (ZOCOR) 40 mg tablet, Take 40 mg by mouth daily at bedtime  , Disp: , Rfl:     sitaGLIPtin (JANUVIA) 25 mg tablet, , Disp: , Rfl:     zolpidem (AMBIEN) 5 mg tablet, Take 5 mg by mouth daily at bedtime as needed for sleep , Disp: , Rfl:     doxycycline hyclate (VIBRAMYCIN) 100 mg capsule, doxycycline hyclate 100 mg capsule, Disp: , Rfl:     pantoprazole (PROTONIX) 40 mg tablet, pantoprazole 40 mg tablet,delayed release, Disp: , Rfl:     ranitidine (ZANTAC) 300 MG tablet, , Disp: , Rfl:     Zoster Vac Recomb Adjuvanted (SHINGRIX) 50 MCG/0 5ML SUSR, Shingrix (PF) 50 mcg/0 5 mL intramuscular suspension, kit  ADM 0 5ML IM UTD, Disp: , Rfl:     Zoster Vaccine Live (ZOSTAVAX) 88383 UNT/0 65ML SUSR, Zostavax (PF) 19,400 unit/0 65 mL subcutaneous suspension, Disp: , Rfl:   Allergies   Allergen Reactions    Amoxicillin Other (See Comments)     C-diff    Azithromycin Diarrhea    Ciprofloxacin Other (See Comments)     Unknown    Clindamycin     Metronidazole Other (See Comments)     Nausea and severe headache    Penicillins     Promethazine Other (See Comments)     dizziness    Sulfa Antibiotics     Bactrim [Sulfamethoxazole-Trimethoprim] Rash       The following portions of the patient's history were reviewed and updated as appropriate: allergies, current medications, past family history, past medical history, past social history, past surgical history and problem list         Vitals:    09/04/19 0927   BP: 118/70   Pulse: 78   Resp: 18   Temp: (!) 97 4 °F (36 3 °C)       Physical Exam   Constitutional: She is oriented to person, place, and time  She appears well-developed and well-nourished  HENT:   Head: Normocephalic and atraumatic  Cardiovascular: Normal heart sounds  Pulmonary/Chest: Breath sounds normal  Right breast exhibits skin change (mastectomy scar)  Right breast exhibits no mass and no tenderness  Left breast exhibits no inverted nipple, no mass, no nipple discharge, no skin change and no tenderness  Abdominal: Soft  Lymphadenopathy:        Right axillary: No pectoral and no lateral adenopathy present          Left axillary: No pectoral and no lateral adenopathy present  Right: No supraclavicular adenopathy present  Left: No supraclavicular adenopathy present  Neurological: She is alert and oriented to person, place, and time  Psychiatric: She has a normal mood and affect  Results:  Labs:      Imaging  Recent renal us negative    I reviewed the above imaging data  Discussion/Summary:79 yo female s/p right completion mastectomy for recurrent DCIS  There is no evidence of disease based on exam today  She will be due for a left mammogram in January  I will make these arrangements for her  I will see her again in six months

## 2020-01-06 ENCOUNTER — HOSPITAL ENCOUNTER (OUTPATIENT)
Dept: MAMMOGRAPHY | Facility: MEDICAL CENTER | Age: 80
Discharge: HOME | End: 2020-01-06
Payer: MEDICARE

## 2020-01-06 VITALS — WEIGHT: 150 LBS | BODY MASS INDEX: 22.73 KG/M2 | HEIGHT: 68 IN

## 2020-01-06 DIAGNOSIS — Z12.39 BREAST CANCER SCREENING, HIGH RISK PATIENT: ICD-10-CM

## 2020-01-06 PROCEDURE — 77063 BREAST TOMOSYNTHESIS BI: CPT

## 2020-01-06 PROCEDURE — 77067 SCR MAMMO BI INCL CAD: CPT

## 2020-05-28 ENCOUNTER — TELEPHONE (OUTPATIENT)
Dept: UROLOGY | Facility: MEDICAL CENTER | Age: 80
End: 2020-05-28

## 2020-05-28 DIAGNOSIS — Z90.5 HISTORY OF PARTIAL NEPHRECTOMY: ICD-10-CM

## 2020-05-28 DIAGNOSIS — Z85.528 PERSONAL HISTORY OF OTHER MALIGNANT NEOPLASM OF KIDNEY: Primary | ICD-10-CM

## 2020-06-03 ENCOUNTER — HOSPITAL ENCOUNTER (OUTPATIENT)
Dept: ULTRASOUND IMAGING | Facility: MEDICAL CENTER | Age: 80
Discharge: HOME | End: 2020-06-03
Attending: UROLOGY
Payer: MEDICARE

## 2020-06-03 DIAGNOSIS — Z85.528 PERSONAL HISTORY OF OTHER MALIGNANT NEOPLASM OF KIDNEY: ICD-10-CM

## 2020-06-03 DIAGNOSIS — Z90.5 HISTORY OF PARTIAL NEPHRECTOMY: ICD-10-CM

## 2020-06-03 PROCEDURE — 76770 US EXAM ABDO BACK WALL COMP: CPT

## 2020-06-09 RX ORDER — TRAMADOL HYDROCHLORIDE 50 MG/1
TABLET ORAL EVERY 6 HOURS
COMMUNITY
End: 2021-06-18 | Stop reason: HOSPADM

## 2020-06-10 ENCOUNTER — TELEMEDICINE (OUTPATIENT)
Dept: UROLOGY | Facility: MEDICAL CENTER | Age: 80
End: 2020-06-10
Payer: MEDICARE

## 2020-06-10 DIAGNOSIS — Z98.890 S/P ROBOT-ASSISTED SURGICAL PROCEDURE: ICD-10-CM

## 2020-06-10 DIAGNOSIS — C64.2 MALIGNANT NEOPLASM OF LEFT KIDNEY (HCC): Primary | ICD-10-CM

## 2020-06-10 DIAGNOSIS — Z90.5 HISTORY OF PARTIAL NEPHRECTOMY: ICD-10-CM

## 2020-06-10 PROCEDURE — 99214 OFFICE O/P EST MOD 30 MIN: CPT | Performed by: UROLOGY

## 2020-06-25 ENCOUNTER — TELEPHONE (OUTPATIENT)
Dept: SURGICAL ONCOLOGY | Facility: CLINIC | Age: 80
End: 2020-06-25

## 2020-06-29 ENCOUNTER — OFFICE VISIT (OUTPATIENT)
Dept: SURGICAL ONCOLOGY | Facility: CLINIC | Age: 80
End: 2020-06-29
Payer: MEDICARE

## 2020-06-29 VITALS
SYSTOLIC BLOOD PRESSURE: 120 MMHG | WEIGHT: 151 LBS | RESPIRATION RATE: 18 BRPM | HEIGHT: 68 IN | HEART RATE: 84 BPM | TEMPERATURE: 98.7 F | DIASTOLIC BLOOD PRESSURE: 72 MMHG | BODY MASS INDEX: 22.88 KG/M2

## 2020-06-29 DIAGNOSIS — Z86.000 PERSONAL HISTORY OF IN-SITU NEOPLASM OF BREAST: ICD-10-CM

## 2020-06-29 DIAGNOSIS — Z85.3 ENCOUNTER FOR FOLLOW-UP SURVEILLANCE OF BREAST CANCER: Primary | ICD-10-CM

## 2020-06-29 DIAGNOSIS — Z12.39 BREAST CANCER SCREENING, HIGH RISK PATIENT: ICD-10-CM

## 2020-06-29 DIAGNOSIS — Z08 ENCOUNTER FOR FOLLOW-UP SURVEILLANCE OF BREAST CANCER: Primary | ICD-10-CM

## 2020-06-29 PROCEDURE — 99213 OFFICE O/P EST LOW 20 MIN: CPT | Performed by: SURGERY

## 2020-08-27 ENCOUNTER — TELEPHONE (OUTPATIENT)
Dept: UROLOGY | Facility: MEDICAL CENTER | Age: 80
End: 2020-08-27

## 2020-08-27 DIAGNOSIS — Z85.528 PERSONAL HISTORY OF OTHER MALIGNANT NEOPLASM OF KIDNEY: Primary | ICD-10-CM

## 2020-08-27 NOTE — TELEPHONE ENCOUNTER
Pt of Dr Campos will be under Dr Shannon's care she will need updated physician's order xray chest pa & lateral for next yr entered into system as she uses [facility]

## 2020-09-25 DIAGNOSIS — Z86.000 PERSONAL HISTORY OF IN-SITU NEOPLASM OF BREAST: Primary | ICD-10-CM

## 2021-01-07 ENCOUNTER — HOSPITAL ENCOUNTER (OUTPATIENT)
Dept: MAMMOGRAPHY | Facility: MEDICAL CENTER | Age: 81
Discharge: HOME | End: 2021-01-07
Payer: MEDICARE

## 2021-01-07 VITALS — WEIGHT: 151 LBS | HEIGHT: 67 IN | BODY MASS INDEX: 23.7 KG/M2

## 2021-01-07 DIAGNOSIS — Z12.31 ENCOUNTER FOR SCREENING MAMMOGRAM FOR HIGH-RISK PATIENT: ICD-10-CM

## 2021-01-07 DIAGNOSIS — Z12.39 BREAST CANCER SCREENING, HIGH RISK PATIENT: ICD-10-CM

## 2021-01-07 PROCEDURE — 77063 BREAST TOMOSYNTHESIS BI: CPT

## 2021-01-07 PROCEDURE — 77067 SCR MAMMO BI INCL CAD: CPT

## 2021-01-15 ENCOUNTER — TELEPHONE (OUTPATIENT)
Dept: SURGICAL ONCOLOGY | Facility: CLINIC | Age: 81
End: 2021-01-15

## 2021-01-15 NOTE — TELEPHONE ENCOUNTER
Patient is not  having any symptoms of  fever, cough, shortness of breath, chills, repeated shaking with chills, muscle pain, headache, sore throat, or new loss of taste/smell  Patient has not been tested for COVID -19  Patient has not been in contact with someone confirmed to have COVID-19  Patient has not travelled in the past 14 days  Reviewed masking policy with patient  Reviewed visitor policy with patient

## 2021-01-18 ENCOUNTER — OFFICE VISIT (OUTPATIENT)
Dept: SURGICAL ONCOLOGY | Facility: CLINIC | Age: 81
End: 2021-01-18
Payer: MEDICARE

## 2021-01-18 VITALS
DIASTOLIC BLOOD PRESSURE: 80 MMHG | BODY MASS INDEX: 23.7 KG/M2 | SYSTOLIC BLOOD PRESSURE: 140 MMHG | HEIGHT: 67 IN | TEMPERATURE: 97.1 F | HEART RATE: 72 BPM | WEIGHT: 151 LBS

## 2021-01-18 DIAGNOSIS — Z86.000 PERSONAL HISTORY OF IN-SITU NEOPLASM OF BREAST: ICD-10-CM

## 2021-01-18 DIAGNOSIS — Z85.3 ENCOUNTER FOR FOLLOW-UP SURVEILLANCE OF BREAST CANCER: Primary | ICD-10-CM

## 2021-01-18 DIAGNOSIS — Z12.31 ENCOUNTER FOR SCREENING MAMMOGRAM FOR MALIGNANT NEOPLASM OF BREAST: ICD-10-CM

## 2021-01-18 DIAGNOSIS — Z08 ENCOUNTER FOR FOLLOW-UP SURVEILLANCE OF BREAST CANCER: Primary | ICD-10-CM

## 2021-01-18 DIAGNOSIS — Z12.39 BREAST CANCER SCREENING, HIGH RISK PATIENT: ICD-10-CM

## 2021-01-18 PROCEDURE — 99213 OFFICE O/P EST LOW 20 MIN: CPT | Performed by: SURGERY

## 2021-01-18 NOTE — PROGRESS NOTES
Surgical Oncology Follow Up       [facility] SURGICAL ONCOLOGY [facility] [id number]    Anai Rozina  1940  [id number]  [facility] SURGICAL ONCOLOGY [place]  [address] [address]    Chief Complaint   Patient presents with    Follow-up       Assessment/Plan   Diagnoses and all orders for this visit:    Encounter for follow-up surveillance of breast cancer    Breast cancer screening, high risk patient  -     Mammo screening left w 3d & cad; Future    Encounter for screening mammogram for malignant neoplasm of breast   -     Mammo screening left w 3d & cad; Future    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    Other orders  -     Cholecalciferol (Vitamin D3) 50 MCG (2000 UT) CHEW        Advance Care Planning/Advance Directives:  Discussed disease status, cancer treatment plans and/or cancer treatment goals with the patient  Oncology History:    Oncology History   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    Initial Diagnosis    Intraductal carcinoma in situ of right breast     2009 Surgery    Partial mastectomy     1/8/2016 Surgery    Right breast lumpectomy     3/8/2016 Surgery    Right breast mastectomy, expander reconstruction      Surgery    Removal of breast expander      Radiation    PBRT      Hormone Therapy    declined         History of Present Illness:  Breast cancer follow-up, no concerns  -Interval History:  Recent mammogram    Review of Systems:  Review of Systems   Constitutional: Negative  Negative for appetite change and fever  Eyes: Negative  Respiratory: Negative for shortness of breath  Cardiovascular: Negative  Gastrointestinal: Negative  Endocrine: Negative  Genitourinary: Negative  Musculoskeletal: Negative  Negative for arthralgias and myalgias  Skin: Negative  Allergic/Immunologic: Negative  Neurological: Negative      Hematological: Negative  Negative for adenopathy  Does not bruise/bleed easily  Psychiatric/Behavioral: Negative          Patient Active Problem List   Diagnosis    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    Breast cancer screening, high risk patient    Encounter for follow-up surveillance of breast cancer     Past Medical History:   Diagnosis Date    Acid reflux     Allergic rhinitis     C  difficile diarrhea     CIS (carcinoma in situ of cervix)     Diarrhea     Environmental allergies     Heartburn     High cholesterol     Hypertension     Menopausal symptoms     Nausea and vomiting     Osteopenia     Other microscopic hematuria     Personal history of other malignant neoplasm of kidney     Renal mass     Renal/ureteral disease     Sarcoid     UTI (urinary tract infection)     Wears glasses     Wears glasses      Past Surgical History:   Procedure Laterality Date    BREAST LUMPECTOMY Right 2016    BREAST SURGERY Right     lumpectomy    CERVICAL CONIZATION   W/ LASER      CHOLECYSTECTOMY      COLON SURGERY      COLONOSCOPY      DEEP AXILLARY SENTINEL NODE BIOPSY / EXCISION      FOOT SURGERY      INTRAOPERATIVE RADIATION THERAPY (IORT)  2009    JOINT REPLACEMENT Right     knee    MASTECTOMY Right 2016    PARTIAL NEPHRECTOMY Left     SC MASTECTOMY, SIMPLE, COMPLETE Right 3/8/2016    Procedure: MASTECTOMY SIMPLE;  Surgeon: Amina Smalls MD;  Location: [facility];  Service: General    SC TISSUE EXPANDER PLACEMENT BREAST RECONSTRUCTION Right 3/8/2016    Procedure: INSERTION/EXPANDER  IMPLANT BREAST ;  Surgeon: Chandni Hoyos MD;  Location: [facility];  Service: Plastics    TOTAL KNEE ARTHROPLASTY Right      Family History   Problem Relation Age of Onset    Alzheimer's disease Mother     Breast cancer Mother         age dx unk    Kidney failure Father     Prostate cancer Brother     No Known Problems Sister     No Known Problems Maternal Grandmother     No Known Problems Maternal Grandfather     No Known Problems Paternal Grandmother     No Known Problems Paternal Grandfather     No Known Problems Maternal Aunt     No Known Problems Maternal Aunt     No Known Problems Maternal Aunt     No Known Problems Maternal Aunt     No Known Problems Maternal Aunt     No Known Problems Maternal Aunt      Social History     Socioeconomic History    Marital status: /Civil Union     Spouse name: Not on file    Number of children: Not on file    Years of education: Not on file    Highest education level: Not on file   Occupational History    Not on file   Social Needs    Financial resource strain: Not on file    Food insecurity     Worry: Not on file     Inability: Not on file    Transportation needs     Medical: Not on file     Non-medical: Not on file   Tobacco Use    Smoking status: Former Smoker     Quit date: 1975     Years since quittin 9    Smokeless tobacco: Never Used   Substance and Sexual Activity    Alcohol use:  Yes     Alcohol/week: 1 0 standard drinks     Types: 1 Glasses of wine per week    Drug use: No    Sexual activity: Not on file   Lifestyle    Physical activity     Days per week: Not on file     Minutes per session: Not on file    Stress: Not on file   Relationships    Social connections     Talks on phone: Not on file     Gets together: Not on file     Attends Taoism service: Not on file     Active member of club or organization: Not on file     Attends meetings of clubs or organizations: Not on file     Relationship status: Not on file    Intimate partner violence     Fear of current or ex partner: Not on file     Emotionally abused: Not on file     Physically abused: Not on file     Forced sexual activity: Not on file   Other Topics Concern    Not on file   Social History Narrative    Not on file       Current Outpatient Medications:     ALPRAZolam (XANAX) 0 5 mg tablet, alprazolam 0 5 mg tablet, Disp: , Rfl:     cetirizine (ZyrTEC) 10 mg tablet, Take 10 mg by mouth daily  , Disp: , Rfl:     Cholecalciferol (Vitamin D3) 50 MCG (2000 UT) CHEW, , Disp: , Rfl:     CHOLESTYRAMINE PO, cholestyramine (with sugar) 4 gram powder for susp in a packet, Disp: , Rfl:     diclofenac sodium (VOLTAREN) 1 %, CATRACHITA 2 GRAMS EXT AA QID, Disp: , Rfl: 5    lisinopril (ZESTRIL) 20 mg tablet, Take 20 mg by mouth daily  , Disp: , Rfl:     pantoprazole (PROTONIX) 40 mg tablet, pantoprazole 40 mg tablet,delayed release, Disp: , Rfl:     pyridoxine (B-6) 100 MG tablet, Take 100 mg by mouth, Disp: , Rfl:     simvastatin (ZOCOR) 40 mg tablet, Take 40 mg by mouth daily at bedtime  , Disp: , Rfl:     sitaGLIPtin (JANUVIA) 25 mg tablet, , Disp: , Rfl:     zolpidem (AMBIEN) 5 mg tablet, Take 5 mg by mouth daily at bedtime as needed for sleep , Disp: , Rfl:     Zoster Vac Recomb Adjuvanted (SHINGRIX) 50 MCG/0 5ML SUSR, Shingrix (PF) 50 mcg/0 5 mL intramuscular suspension, kit  ADM 0 5ML IM UTD, Disp: , Rfl:     Zoster Vaccine Live (ZOSTAVAX) 58253 UNT/0 65ML SUSR, Zostavax (PF) 19,400 unit/0 65 mL subcutaneous suspension, Disp: , Rfl:     traMADol (Ultram) 50 mg tablet, every 6 (six) hours, Disp: , Rfl:   Allergies   Allergen Reactions    Amoxicillin Other (See Comments)     C-diff    Azithromycin Diarrhea    Ciprofloxacin Other (See Comments)     Unknown    Clindamycin     Metronidazole Other (See Comments)     Nausea and severe headache    Penicillins     Promethazine Other (See Comments)     dizziness    Sulfa Antibiotics     Bactrim [Sulfamethoxazole-Trimethoprim] Rash       The following portions of the patient's history were reviewed and updated as appropriate: allergies, current medications, past family history, past medical history, past social history, past surgical history and problem list         Vitals:    01/18/21 1350   BP: 140/80   Pulse: 72   Temp: (!) 97 1 °F (36 2 °C)       Physical Exam  Constitutional:       General: She is not in acute distress  Appearance: Normal appearance  She is well-developed  HENT:      Head: Normocephalic and atraumatic  Cardiovascular:      Heart sounds: Normal heart sounds  Pulmonary:      Breath sounds: Normal breath sounds  Chest:      Breasts:         Right: Skin change (Mastectomy scar) present  No mass or tenderness  Left: No inverted nipple, mass, nipple discharge, skin change or tenderness  Abdominal:      Palpations: Abdomen is soft  Lymphadenopathy:      Upper Body:      Right upper body: No supraclavicular, axillary or pectoral adenopathy  Left upper body: No supraclavicular, axillary or pectoral adenopathy  Neurological:      Mental Status: She is alert and oriented to person, place, and time  Psychiatric:         Mood and Affect: Mood normal            Results:  Labs:      Imaging  01/07/2021 bilateral 3D screening mammogram is benign BI-RADS two with a density of two    I reviewed the above imaging data  Discussion/Summary:  79-year-old female status post right mastectomy following recurrent ductal carcinoma in situ  There is no evidence of disease based on examination today or contralateral mammogram   She is roughly five years out of her diagnosis and surgery  I will therefore see her again in one year following her mammogram or sooner should the need arise

## 2021-01-20 DIAGNOSIS — Z23 ENCOUNTER FOR IMMUNIZATION: ICD-10-CM

## 2021-06-07 ENCOUNTER — APPOINTMENT (OUTPATIENT)
Dept: RADIOLOGY | Facility: MEDICAL CENTER | Age: 81
End: 2021-06-07
Attending: UROLOGY
Payer: MEDICARE

## 2021-06-07 DIAGNOSIS — Z85.528 PERSONAL HISTORY OF OTHER MALIGNANT NEOPLASM OF KIDNEY: ICD-10-CM

## 2021-06-07 DIAGNOSIS — Z85.528 HISTORY OF RENAL CELL CARCINOMA: Primary | ICD-10-CM

## 2021-06-07 PROCEDURE — 71046 X-RAY EXAM CHEST 2 VIEWS: CPT

## 2021-06-09 ENCOUNTER — HOSPITAL ENCOUNTER (OUTPATIENT)
Dept: ULTRASOUND IMAGING | Facility: HOSPITAL | Age: 81
Discharge: HOME | End: 2021-06-09
Attending: UROLOGY
Payer: MEDICARE

## 2021-06-09 DIAGNOSIS — Z85.528 HISTORY OF RENAL CELL CARCINOMA: ICD-10-CM

## 2021-06-09 PROCEDURE — 76770 US EXAM ABDO BACK WALL COMP: CPT

## 2021-06-18 ENCOUNTER — OFFICE VISIT (OUTPATIENT)
Dept: UROLOGY | Facility: MEDICAL CENTER | Age: 81
End: 2021-06-18
Payer: MEDICARE

## 2021-06-18 VITALS
HEIGHT: 67 IN | DIASTOLIC BLOOD PRESSURE: 78 MMHG | WEIGHT: 152 LBS | SYSTOLIC BLOOD PRESSURE: 142 MMHG | HEART RATE: 70 BPM | BODY MASS INDEX: 23.86 KG/M2

## 2021-06-18 DIAGNOSIS — Z85.528 HISTORY OF RENAL CELL CARCINOMA: Primary | ICD-10-CM

## 2021-06-18 LAB
SL AMB  POCT GLUCOSE, UA: ABNORMAL
SL AMB LEUKOCYTE ESTERASE,UA: ABNORMAL
SL AMB POCT BILIRUBIN,UA: ABNORMAL
SL AMB POCT BLOOD,UA: ABNORMAL
SL AMB POCT CLARITY,UA: CLEAR
SL AMB POCT COLOR,UA: YELLOW
SL AMB POCT KETONES,UA: ABNORMAL
SL AMB POCT NITRITE,UA: ABNORMAL
SL AMB POCT PH,UA: 6
SL AMB POCT SPECIFIC GRAVITY,UA: 1.01
SL AMB POCT URINE PROTEIN: ABNORMAL
SL AMB POCT UROBILINOGEN: 0.2

## 2021-06-18 PROCEDURE — 81003 URINALYSIS AUTO W/O SCOPE: CPT | Performed by: UROLOGY

## 2021-06-18 PROCEDURE — 99214 OFFICE O/P EST MOD 30 MIN: CPT | Performed by: UROLOGY

## 2021-06-18 NOTE — PROGRESS NOTES
Assessment/Plan:    History of renal cell carcinoma  No evidence of recurrent disease  Reassess in 1 year  No imaging studies have been ordered prior to the visit  The need for imaging can be assessed during the visit  Per NCCN guidelines, imaging is no longer recommended in the absence of symptoms or signs of recurrent disease  Diagnoses and all orders for this visit:    History of renal cell carcinoma  -     POCT urine dip auto non-scope          Subjective:      Patient ID: Anson Allan is a 79 y o  female  HPI  History of renal cell carcinoma: In Feb 2013 she had a left partial nephrectomy for Renal cell Ca, stage T1a  No urinary sx  Per NCCN guidelines, version 4 dot 2021, page [id number] page 2 of 5, pt does not need routine imaging unless symptomatic  Annual history and physical and appropriate lab work is indicated  This page has been scanned into the chart in the media tab  Accompanied by       The following portions of the patient's history were reviewed and updated as appropriate: allergies, current medications, past family history, past medical history, past social history, past surgical history and problem list     Review of Systems   Constitutional: Negative for activity change and fatigue  Respiratory: Negative for shortness of breath and wheezing  Cardiovascular: Negative for chest pain  Hypertension  Gastrointestinal: Negative for abdominal pain  Endocrine:        Non- insulin dependent diabetes  Genitourinary: Negative for difficulty urinating, dysuria, frequency, hematuria and urgency  Musculoskeletal: Negative for back pain and gait problem  Skin: Negative  Allergic/Immunologic: Negative  Neurological: Negative  Psychiatric/Behavioral: Negative            Objective:      /78   Pulse 70   Ht 5' 7" (1 702 m)   Wt 68 9 kg (152 lb)   BMI 23 81 kg/m²          Physical Exam  Constitutional:       Appearance: She is well-developed  HENT:      Head: Normocephalic and atraumatic  Pulmonary:      Effort: Pulmonary effort is normal    Musculoskeletal:         General: Normal range of motion  Cervical back: Normal range of motion and neck supple  Neurological:      Mental Status: She is alert and oriented to person, place, and time  Deep Tendon Reflexes: Reflexes are normal and symmetric  Psychiatric:         Behavior: Behavior normal          Thought Content:  Thought content normal          Judgment: Judgment normal

## 2021-06-19 PROBLEM — Z85.528 HISTORY OF RENAL CELL CARCINOMA: Status: ACTIVE | Noted: 2021-06-19

## 2021-06-19 NOTE — ASSESSMENT & PLAN NOTE
No evidence of recurrent disease  Reassess in 1 year  No imaging studies have been ordered prior to the visit  The need for imaging can be assessed during the visit  Per NCCN guidelines, imaging is no longer recommended in the absence of symptoms or signs of recurrent disease

## 2021-08-05 ENCOUNTER — OFFICE VISIT (OUTPATIENT)
Dept: GYNECOLOGY | Facility: CLINIC | Age: 81
End: 2021-08-05
Payer: MEDICARE

## 2021-08-05 VITALS
HEART RATE: 96 BPM | BODY MASS INDEX: 23.7 KG/M2 | WEIGHT: 151 LBS | DIASTOLIC BLOOD PRESSURE: 78 MMHG | HEIGHT: 67 IN | SYSTOLIC BLOOD PRESSURE: 128 MMHG

## 2021-08-05 DIAGNOSIS — N81.3 UTEROVAGINAL PROLAPSE, COMPLETE: Primary | ICD-10-CM

## 2021-08-05 PROCEDURE — 99203 OFFICE O/P NEW LOW 30 MIN: CPT | Performed by: OBSTETRICS & GYNECOLOGY

## 2021-08-05 NOTE — PROGRESS NOTES
Assessment/Plan:         Diagnoses and all orders for this visit:    Uterovaginal prolapse, complete; discussed options including following versus pessary versus surgical management  At this point patient opts to follow  If she becomes more symptomatic she would consider pessary        Subjective:      Patient ID: Huy Kaufman is a 79 y o  female  HPI P 3 presents to the office to rule out uterine prolapse patient states that over the past year she has noticed a protrusion from the opening of the vagina  This has become worse over the past couple months  She denies any vaginal irritation, burning, discharge or bleeding  She denies any dysuria, hematuria urgency or urinary incontinence  She denies any need for stenting to void or to have a bowel movement  Patient does have a history of left renal cell cancer diagnosed in 2013 she is status post left partial nephrectomy  She also has a history of breast cancer insight 2 x 2  She also has a history of CIS of the cervix  Not sexually active    Denies any vaginal or pelvic pain    The following portions of the patient's history were reviewed and updated as appropriate:   She  has a past medical history of Acid reflux, Allergic rhinitis, C  difficile diarrhea, CIS (carcinoma in situ of cervix), Diarrhea, Environmental allergies, Heartburn, High cholesterol, Hypertension, Menopausal symptoms, Nausea and vomiting, Osteopenia, Other microscopic hematuria, Personal history of other malignant neoplasm of kidney, Renal mass, Renal/ureteral disease, Sarcoid, UTI (urinary tract infection), Wears glasses, and Wears glasses    She   Patient Active Problem List    Diagnosis Date Noted    History of renal cell carcinoma 06/19/2021    Encounter for follow-up surveillance of breast cancer 03/06/2019    Breast cancer screening, high risk patient 09/05/2018    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      She  has a past surgical history that includes Breast surgery (Right); Cholecystectomy; Joint replacement (Right); Colon surgery; Partial nephrectomy (Left); Total knee arthroplasty (Right); pr mastectomy, simple, complete (Right, 3/8/2016); pr tissue expander placement breast reconstruction (Right, 3/8/2016); INTRAOPERATIVE RADIATION THERAPY (IORT) (2009); Cervical conization w/ laser; Colonoscopy; Deep axillary sentinel node biopsy / excision; Foot surgery; Breast lumpectomy (Right, 2016); and Mastectomy (Right, 2016)  Her family history includes Alzheimer's disease in her mother; Breast cancer in her mother; Kidney failure in her father; No Known Problems in her maternal aunt, maternal aunt, maternal aunt, maternal aunt, maternal aunt, maternal aunt, maternal grandfather, maternal grandmother, paternal grandfather, paternal grandmother, and sister; Prostate cancer in her brother  She  reports that she quit smoking about 46 years ago  She has never used smokeless tobacco  She reports current alcohol use of about 1 0 standard drinks of alcohol per week  She reports that she does not use drugs  Current Outpatient Medications   Medication Sig Dispense Refill    ALPRAZolam (XANAX) 0 5 mg tablet alprazolam 0 5 mg tablet      cetirizine (ZyrTEC) 10 mg tablet Take 10 mg by mouth daily   Cholecalciferol (Vitamin D3) 50 MCG (2000 UT) CHEW       CHOLESTYRAMINE PO cholestyramine (with sugar) 4 gram powder for susp in a packet      diclofenac sodium (VOLTAREN) 1 % CATRACHITA 2 GRAMS EXT AA QID  5    lisinopril (ZESTRIL) 20 mg tablet Take 20 mg by mouth daily   pantoprazole (PROTONIX) 40 mg tablet pantoprazole 40 mg tablet,delayed release      pyridoxine (B-6) 100 MG tablet Take 100 mg by mouth      simvastatin (ZOCOR) 40 mg tablet Take 40 mg by mouth daily at bedtime   sitaGLIPtin (JANUVIA) 25 mg tablet       zolpidem (AMBIEN) 5 mg tablet Take 5 mg by mouth daily at bedtime as needed for sleep        Zoster Vac Recomb Adjuvanted (SHINGRIX) 50 MCG/0 5ML SUSR Shingrix (PF) 50 mcg/0 5 mL intramuscular suspension, kit   ADM 0 5ML IM UTD (Patient not taking: Reported on 6/18/2021)      Zoster Vaccine Live (ZOSTAVAX) 42659 UNT/0 65ML SUSR Zostavax (PF) 19,400 unit/0 65 mL subcutaneous suspension (Patient not taking: Reported on 6/18/2021)       No current facility-administered medications for this visit  Current Outpatient Medications on File Prior to Visit   Medication Sig    ALPRAZolam (XANAX) 0 5 mg tablet alprazolam 0 5 mg tablet    cetirizine (ZyrTEC) 10 mg tablet Take 10 mg by mouth daily   Cholecalciferol (Vitamin D3) 50 MCG (2000 UT) CHEW     CHOLESTYRAMINE PO cholestyramine (with sugar) 4 gram powder for susp in a packet    diclofenac sodium (VOLTAREN) 1 % CATRACHITA 2 GRAMS EXT AA QID    lisinopril (ZESTRIL) 20 mg tablet Take 20 mg by mouth daily   pantoprazole (PROTONIX) 40 mg tablet pantoprazole 40 mg tablet,delayed release    pyridoxine (B-6) 100 MG tablet Take 100 mg by mouth    simvastatin (ZOCOR) 40 mg tablet Take 40 mg by mouth daily at bedtime   sitaGLIPtin (JANUVIA) 25 mg tablet     zolpidem (AMBIEN) 5 mg tablet Take 5 mg by mouth daily at bedtime as needed for sleep   Zoster Vac Recomb Adjuvanted (SHINGRIX) 50 MCG/0 5ML SUSR Shingrix (PF) 50 mcg/0 5 mL intramuscular suspension, kit   ADM 0 5ML IM UTD (Patient not taking: Reported on 6/18/2021)    Zoster Vaccine Live (ZOSTAVAX) 84429 UNT/0 65ML SUSR Zostavax (PF) 19,400 unit/0 65 mL subcutaneous suspension (Patient not taking: Reported on 6/18/2021)     No current facility-administered medications on file prior to visit  She is allergic to amoxicillin, azithromycin, ciprofloxacin, clindamycin, metronidazole, penicillins, promethazine, sulfa antibiotics, and bactrim [sulfamethoxazole-trimethoprim]       Review of Systems   Gastrointestinal: Negative      Genitourinary:        See HPI         Objective:      /78 (BP Location: Left arm, Patient Position: Sitting, Cuff Size: Standard)   Pulse 96   Ht 5' 7" (1 702 m)   Wt 68 5 kg (151 lb)   BMI 23 65 kg/m²          Physical Exam  Abdominal:      General: There is no distension  Palpations: Abdomen is soft  There is no mass  Tenderness: There is no abdominal tenderness  There is no guarding or rebound  Hernia: No hernia is present  There is no hernia in the left inguinal area or right inguinal area  Genitourinary:     General: Normal vulva  Labia:         Right: No rash, tenderness or lesion  Left: No rash, tenderness or lesion  Comments: Complete uterovaginal prolapse  Uterus is anteverted normal size and contour freely mobile nontender  There are no palpable adnexal masses or tenderness  Cervix appears normal   Vagina with atrophic changes  [place]'s and [place]'s glands normal   Urethral orifice normal   Lymphadenopathy:      Lower Body: No right inguinal adenopathy  No left inguinal adenopathy

## 2021-11-24 ENCOUNTER — OFFICE VISIT (OUTPATIENT)
Dept: UROLOGY | Facility: MEDICAL CENTER | Age: 81
End: 2021-11-24
Payer: MEDICARE

## 2021-11-24 VITALS
HEART RATE: 62 BPM | BODY MASS INDEX: 23.54 KG/M2 | SYSTOLIC BLOOD PRESSURE: 140 MMHG | HEIGHT: 67 IN | WEIGHT: 150 LBS | DIASTOLIC BLOOD PRESSURE: 80 MMHG

## 2021-11-24 DIAGNOSIS — R35.1 NOCTURIA: ICD-10-CM

## 2021-11-24 DIAGNOSIS — Z85.528 HISTORY OF RENAL CELL CARCINOMA: Primary | ICD-10-CM

## 2021-11-24 LAB
POST-VOID RESIDUAL VOLUME, ML POC: 143 ML
SL AMB  POCT GLUCOSE, UA: ABNORMAL
SL AMB LEUKOCYTE ESTERASE,UA: ABNORMAL
SL AMB POCT BILIRUBIN,UA: ABNORMAL
SL AMB POCT BLOOD,UA: ABNORMAL
SL AMB POCT CLARITY,UA: CLEAR
SL AMB POCT COLOR,UA: YELLOW
SL AMB POCT KETONES,UA: ABNORMAL
SL AMB POCT NITRITE,UA: ABNORMAL
SL AMB POCT PH,UA: 7
SL AMB POCT SPECIFIC GRAVITY,UA: 1.01
SL AMB POCT URINE PROTEIN: ABNORMAL
SL AMB POCT UROBILINOGEN: 0.2

## 2021-11-24 PROCEDURE — 99214 OFFICE O/P EST MOD 30 MIN: CPT | Performed by: UROLOGY

## 2021-11-24 PROCEDURE — 51798 US URINE CAPACITY MEASURE: CPT | Performed by: UROLOGY

## 2021-11-24 PROCEDURE — 81003 URINALYSIS AUTO W/O SCOPE: CPT | Performed by: UROLOGY

## 2021-11-26 PROBLEM — R35.1 NOCTURIA: Status: ACTIVE | Noted: 2021-11-26

## 2022-01-10 ENCOUNTER — HOSPITAL ENCOUNTER (OUTPATIENT)
Dept: MAMMOGRAPHY | Facility: MEDICAL CENTER | Age: 82
Discharge: HOME | End: 2022-01-10
Payer: MEDICARE

## 2022-01-10 VITALS — HEIGHT: 67 IN | WEIGHT: 149.91 LBS | BODY MASS INDEX: 23.53 KG/M2

## 2022-01-10 DIAGNOSIS — Z12.31 ENCOUNTER FOR SCREENING MAMMOGRAM FOR MALIGNANT NEOPLASM OF BREAST: ICD-10-CM

## 2022-01-10 DIAGNOSIS — Z12.39 BREAST CANCER SCREENING, HIGH RISK PATIENT: ICD-10-CM

## 2022-01-10 PROCEDURE — 77063 BREAST TOMOSYNTHESIS BI: CPT

## 2022-01-10 PROCEDURE — 77067 SCR MAMMO BI INCL CAD: CPT

## 2022-01-18 ENCOUNTER — OFFICE VISIT (OUTPATIENT)
Dept: SURGICAL ONCOLOGY | Facility: CLINIC | Age: 82
End: 2022-01-18
Payer: MEDICARE

## 2022-01-18 VITALS
SYSTOLIC BLOOD PRESSURE: 130 MMHG | DIASTOLIC BLOOD PRESSURE: 78 MMHG | HEART RATE: 97 BPM | TEMPERATURE: 98.5 F | HEIGHT: 67 IN | OXYGEN SATURATION: 94 % | WEIGHT: 152 LBS | RESPIRATION RATE: 16 BRPM | BODY MASS INDEX: 23.86 KG/M2

## 2022-01-18 DIAGNOSIS — Z12.31 ENCOUNTER FOR SCREENING MAMMOGRAM FOR MALIGNANT NEOPLASM OF BREAST: ICD-10-CM

## 2022-01-18 DIAGNOSIS — Z08 ENCOUNTER FOR FOLLOW-UP SURVEILLANCE OF BREAST CANCER: Primary | ICD-10-CM

## 2022-01-18 DIAGNOSIS — Z85.3 ENCOUNTER FOR FOLLOW-UP SURVEILLANCE OF BREAST CANCER: Primary | ICD-10-CM

## 2022-01-18 DIAGNOSIS — Z12.39 BREAST CANCER SCREENING, HIGH RISK PATIENT: ICD-10-CM

## 2022-01-18 DIAGNOSIS — Z86.000 PERSONAL HISTORY OF IN-SITU NEOPLASM OF BREAST: ICD-10-CM

## 2022-01-18 PROCEDURE — 99213 OFFICE O/P EST LOW 20 MIN: CPT | Performed by: SURGERY

## 2022-01-18 NOTE — PROGRESS NOTES
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    Breast cancer screening, high risk patient  -     Mammo screening left w 3d & cad; Future    Encounter for screening mammogram for malignant neoplasm of breast   -     Mammo screening left w 3d & cad; Future        Advance Care Planning/Advance Directives:  Discussed disease status, cancer treatment plans and/or cancer treatment goals with the patient  Oncology History:    Oncology History   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    Initial Diagnosis    Intraductal carcinoma in situ of right breast     2009 Surgery    Partial mastectomy     1/8/2016 Surgery    Right breast lumpectomy     3/8/2016 Surgery    Right breast mastectomy, expander reconstruction      Surgery    Removal of breast expander      Radiation    PBRT      Hormone Therapy    declined         History of Present Illness:  Breast cancer follow-up, reports occasional discomfort along the right mastectomy incision at the end of the day but no additional concerns  -Interval History:  Recent contralateral mammogram    Review of Systems:  Review of Systems   Constitutional: Negative  Negative for appetite change and fever  Eyes: Negative  Respiratory: Negative for shortness of breath  Cardiovascular: Negative  Gastrointestinal: Negative  Endocrine: Negative  Genitourinary: Negative  Musculoskeletal: Negative  Negative for arthralgias and myalgias  Skin: Negative  Allergic/Immunologic: Negative  Neurological: Negative  Hematological: Negative  Negative for adenopathy  Does not bruise/bleed easily  Psychiatric/Behavioral: Negative          Patient Active Problem List   Diagnosis    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    Breast cancer screening, high risk patient    Encounter for follow-up surveillance of breast cancer    History of renal cell carcinoma    Nocturia     Past Medical History:   Diagnosis Date    Acid reflux     Allergic rhinitis     C  difficile diarrhea     CIS (carcinoma in situ of cervix)     Diarrhea     Environmental allergies     Heartburn     High cholesterol     Hypertension     Menopausal symptoms     Nausea and vomiting     Osteopenia     Other microscopic hematuria     Personal history of other malignant neoplasm of kidney     Renal mass     Renal/ureteral disease     Sarcoid     UTI (urinary tract infection)     Wears glasses     Wears glasses      Past Surgical History:   Procedure Laterality Date    BREAST LUMPECTOMY Right 2016    BREAST SURGERY Right     lumpectomy    CERVICAL CONIZATION   W/ LASER      CHOLECYSTECTOMY      COLON SURGERY      COLONOSCOPY      DEEP AXILLARY SENTINEL NODE BIOPSY / EXCISION      FOOT SURGERY      INTRAOPERATIVE RADIATION THERAPY (IORT)  2009    JOINT REPLACEMENT Right     knee    MASTECTOMY Right 2016    PARTIAL NEPHRECTOMY Left     CO MASTECTOMY, SIMPLE, COMPLETE Right 3/8/2016    Procedure: MASTECTOMY SIMPLE;  Surgeon: Dodie Castillo MD;  [facility];  Service: General    CO TISSUE EXPANDER PLACEMENT BREAST RECONSTRUCTION Right 3/8/2016    Procedure: INSERTION/EXPANDER  IMPLANT BREAST ;  Surgeon: Dannie Phan MD;  [facility];  Service: Plastics    TOTAL KNEE ARTHROPLASTY Right      Family History   Problem Relation Age of Onset    Alzheimer's disease Mother     Breast cancer Mother         age dx unk    Kidney failure Father     Prostate cancer Brother     No Known Problems Sister     No Known Problems Maternal Grandmother     No Known Problems Maternal Grandfather     No Known Problems Paternal Grandmother     No Known Problems Paternal Grandfather     No Known Problems Maternal Aunt     No Known Problems Maternal Aunt     No Known Problems Maternal Aunt     No Known Problems Maternal Aunt     No Known Problems Maternal Aunt     No Known Problems Maternal Aunt      Social History     Socioeconomic History    Marital status: /Civil Union     Spouse name: Not on file    Number of children: Not on file    Years of education: Not on file    Highest education level: Not on file   Occupational History    Not on file   Tobacco Use    Smoking status: Former Smoker     Quit date: 1975     Years since quittin 9    Smokeless tobacco: Never Used   Substance and Sexual Activity    Alcohol use: Yes     Alcohol/week: 1 0 standard drink     Types: 1 Glasses of wine per week    Drug use: No    Sexual activity: Not on file   Other Topics Concern    Not on file   Social History Narrative    Not on file     Social Determinants of Health     Financial Resource Strain: Not on file   Food Insecurity: Not on file   Transportation Needs: Not on file   Physical Activity: Not on file   Stress: Not on file   Social Connections: Not on file   Intimate Partner Violence: Not on file   Housing Stability: Not on file       Current Outpatient Medications:     ALPRAZolam (XANAX) 0 5 mg tablet, alprazolam 0 5 mg tablet, Disp: , Rfl:     cetirizine (ZyrTEC) 10 mg tablet, Take 10 mg by mouth daily  , Disp: , Rfl:     Cholecalciferol (Vitamin D3) 50 MCG ( UT) CHEW, , Disp: , Rfl:     CHOLESTYRAMINE PO, cholestyramine (with sugar) 4 gram powder for susp in a packet, Disp: , Rfl:     diclofenac sodium (VOLTAREN) 1 %, CATRACHITA 2 GRAMS EXT AA QID, Disp: , Rfl: 5    Lactobacillus TABS, Take by mouth, Disp: , Rfl:     lisinopril (ZESTRIL) 20 mg tablet, Take 20 mg by mouth daily , Disp: , Rfl:     pantoprazole (PROTONIX) 40 mg tablet, pantoprazole 40 mg tablet,delayed release, Disp: , Rfl:     pyridoxine (B-6) 100 MG tablet, Take 100 mg by mouth, Disp: , Rfl:     simvastatin (ZOCOR) 40 mg tablet, Take 40 mg by mouth daily at bedtime  , Disp: , Rfl:     sitaGLIPtin (JANUVIA) 25 mg tablet, , Disp: , Rfl:     zolpidem (AMBIEN) 5 mg tablet, Take 5 mg by mouth daily at bedtime as needed for sleep , Disp: , Rfl:     Zoster Vac Recomb Adjuvanted (SHINGRIX) 50 MCG/0 5ML SUSR, Shingrix (PF) 50 mcg/0 5 mL intramuscular suspension, kit  ADM 0 5ML IM UTD, Disp: , Rfl:     Zoster Vaccine Live (ZOSTAVAX) 17783 UNT/0 65ML SUSR, Zostavax (PF) 19,400 unit/0 65 mL subcutaneous suspension, Disp: , Rfl:   Allergies   Allergen Reactions    Amoxicillin Other (See Comments)     C-diff    Azithromycin Diarrhea    Ciprofloxacin Other (See Comments)     Unknown    Clindamycin     Metronidazole Other (See Comments)     Nausea and severe headache    Penicillins     Promethazine Other (See Comments)     dizziness    Sulfa Antibiotics     Bactrim [Sulfamethoxazole-Trimethoprim] Rash       The following portions of the patient's history were reviewed and updated as appropriate: allergies, current medications, past family history, past medical history, past social history, past surgical history and problem list         Vitals:    01/18/22 1333   BP: 130/78   Pulse: 97   Resp: 16   Temp: 98 5 °F (36 9 °C)   SpO2: 94%       Physical Exam  Constitutional:       General: She is not in acute distress  Appearance: Normal appearance  She is well-developed  HENT:      Head: Normocephalic and atraumatic  Cardiovascular:      Heart sounds: Normal heart sounds  Pulmonary:      Breath sounds: Normal breath sounds  Chest:   Breasts:      Right: Skin change (Mastectomy scar) present  No mass, tenderness, axillary adenopathy or supraclavicular adenopathy        Left: No inverted nipple, mass, nipple discharge, skin change, tenderness, axillary adenopathy or supraclavicular adenopathy  Abdominal:      Palpations: Abdomen is soft  Lymphadenopathy:      Upper Body:      Right upper body: No supraclavicular, axillary or pectoral adenopathy  Left upper body: No supraclavicular, axillary or pectoral adenopathy  Neurological:      Mental Status: She is alert and oriented to person, place, and time  Psychiatric:         Mood and Affect: Mood normal            Results:  Labs:      Imaging  01/10/2022 left 3D screening mammogram was benign BI-RADS two with a density of two    I reviewed the above imaging data  Discussion/Summary:  69-year-old female status post right mastectomy for recurrent carcinoma  There is no evidence of disease based on examination today  Her recent contralateral mammogram was benign  I will continue to see her on an annual basis or sooner should the need arise

## 2023-01-11 ENCOUNTER — HOSPITAL ENCOUNTER (OUTPATIENT)
Dept: MAMMOGRAPHY | Facility: MEDICAL CENTER | Age: 83
Discharge: HOME | End: 2023-01-11

## 2023-01-11 VITALS — HEIGHT: 67 IN | BODY MASS INDEX: 23.86 KG/M2 | WEIGHT: 152 LBS

## 2023-01-11 DIAGNOSIS — Z12.39 BREAST CANCER SCREENING, HIGH RISK PATIENT: ICD-10-CM

## 2023-01-11 DIAGNOSIS — Z12.31 ENCOUNTER FOR SCREENING MAMMOGRAM FOR MALIGNANT NEOPLASM OF BREAST: ICD-10-CM

## 2023-01-18 ENCOUNTER — OFFICE VISIT (OUTPATIENT)
Dept: SURGICAL ONCOLOGY | Facility: CLINIC | Age: 83
End: 2023-01-18

## 2023-01-18 VITALS
WEIGHT: 157 LBS | HEIGHT: 67 IN | SYSTOLIC BLOOD PRESSURE: 110 MMHG | DIASTOLIC BLOOD PRESSURE: 76 MMHG | BODY MASS INDEX: 24.64 KG/M2 | TEMPERATURE: 98.2 F

## 2023-01-18 DIAGNOSIS — Z85.3 ENCOUNTER FOR FOLLOW-UP SURVEILLANCE OF BREAST CANCER: Primary | ICD-10-CM

## 2023-01-18 DIAGNOSIS — Z08 ENCOUNTER FOR FOLLOW-UP SURVEILLANCE OF BREAST CANCER: Primary | ICD-10-CM

## 2023-01-18 DIAGNOSIS — Z86.000 PERSONAL HISTORY OF IN-SITU NEOPLASM OF BREAST: ICD-10-CM

## 2023-01-18 DIAGNOSIS — Z12.31 SCREENING MAMMOGRAM FOR BREAST CANCER: ICD-10-CM

## 2023-01-18 NOTE — PROGRESS NOTES
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        Advance Care Planning/Advance Directives:  Discussed disease status, cancer treatment plans and/or cancer treatment goals with the patient  Oncology History:    Oncology History   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    Initial Diagnosis    Intraductal carcinoma in situ of right breast     2009 Surgery    Partial mastectomy     1/8/2016 Surgery    Right breast lumpectomy     3/8/2016 Surgery    Right breast mastectomy, expander reconstruction      Surgery    Removal of breast expander      Radiation    PBRT      Hormone Therapy    declined         History of Present Illness: breast cancer follow up, no concerns  -Interval History: recent mammogram    Review of Systems:  Review of Systems   Constitutional: Negative  Negative for appetite change and fever  Eyes: Negative  Respiratory: Negative for shortness of breath  Cardiovascular: Negative  Gastrointestinal: Negative  Endocrine: Negative  Genitourinary: Negative  Musculoskeletal: Negative  Negative for arthralgias and myalgias  Skin: Negative  Allergic/Immunologic: Negative  Neurological: Negative  Hematological: Negative  Negative for adenopathy  Does not bruise/bleed easily  Psychiatric/Behavioral: Negative          Patient Active Problem List   Diagnosis   • 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 [address] ) 2016   • Diarrhea    • Environmental allergies    • Heartburn    • High cholesterol    • Hypertension    • Menopausal symptoms    • Nausea and vomiting    • Osteopenia    • Other microscopic hematuria    • Personal history of other malignant neoplasm of kidney    • Renal mass    • Renal/ureteral disease    • Sarcoid    • UTI (urinary tract infection)    • Wears glasses    • Wears glasses      Past Surgical History:   Procedure Laterality Date   • AUGMENTATION MAMMAPLASTY  06/24/2016    had to be removed   • BREAST BIOPSY  01/01/2019    lumpectomy   • BREAST LUMPECTOMY Right 2016   • BREAST SURGERY Right     lumpectomy   • CERVICAL CONIZATION   W/ LASER     • CHOLECYSTECTOMY     • COLON SURGERY     • COLONOSCOPY     • DEEP AXILLARY SENTINEL NODE BIOPSY / EXCISION     • FOOT SURGERY     • INTRAOPERATIVE RADIATION THERAPY (IORT)  2009   • JOINT REPLACEMENT Right     knee   • MASTECTOMY Right 2016   • PARTIAL NEPHRECTOMY Left    • NV MASTECTOMY SIMPLE COMPLETE Right 03/08/2016    Procedure: MASTECTOMY SIMPLE;  Surgeon: Parth Logan MD;  [facility];  Service: General   • NV TISSUE EXPANDER PLACEMENT BREAST RECONSTRUCTION Right 03/08/2016    Procedure: INSERTION/EXPANDER  IMPLANT BREAST ;  Surgeon: Teofilo Aase, MD;  [facility];  Service: Plastics   • TOTAL KNEE ARTHROPLASTY Right    • UPPER GASTROINTESTINAL ENDOSCOPY  2018     Family History   Problem Relation Age of Onset   • Alzheimer's disease Mother    • Breast cancer Mother         age dx unk   • Kidney failure Father    • Prostate cancer Brother    • No Known Problems Sister    • Breast cancer Maternal Grandmother         Shanna's mother   • No Known Problems Maternal Grandfather    • No Known Problems Paternal Grandmother    • No Known Problems Paternal Grandfather    • No Known Problems Maternal Aunt    • No Known Problems Maternal Aunt    • No Known Problems Maternal Aunt    • No Known Problems Maternal Aunt    • No Known Problems Maternal Aunt    • No Known Problems Maternal Aunt      Social History     Socioeconomic History   • Marital status: /Civil Union     Spouse name: Not on file   • Number of children: Not on file   • Years of education: Not on file   • Highest education level: Not on file   Occupational History   • Not on file   Tobacco Use   • Smoking status: Former     Packs/day: 0 25     Years: 5 00     Pack years: 1 25     Types: Cigarettes     Quit date: 1975     Years since quittin 9   • Smokeless tobacco: Never   • Tobacco comments:     smoked lighly 55 years ago   Substance and Sexual Activity   • Alcohol use: Yes     Alcohol/week: 1 0 standard drink     Types: 1 Glasses of wine per week   • Drug use: No   • Sexual activity: Not Currently     Partners: Male     Birth control/protection: Post-menopausal   Other Topics Concern   • Not on file   Social History Narrative   • Not on file     Social Determinants of Health     Financial Resource Strain: Not on file   Food Insecurity: Not on file   Transportation Needs: Not on file   Physical Activity: Not on file   Stress: Not on file   Social Connections: Not on file   Intimate Partner Violence: Not on file   Housing Stability: Not on file       Current Outpatient Medications:   •  ALPRAZolam (XANAX) 0 5 mg tablet, alprazolam 0 5 mg tablet, Disp: , Rfl:   •  cetirizine (ZyrTEC) 10 mg tablet, Take 10 mg by mouth daily  , Disp: , Rfl:   •  Cholecalciferol (Vitamin D3) 50 MCG ( UT) CHEW, , Disp: , Rfl:   •  CHOLESTYRAMINE PO, cholestyramine (with sugar) 4 gram powder for susp in a packet, Disp: , Rfl:   •  diclofenac sodium (VOLTAREN) 1 %, CATRACHITA 2 GRAMS EXT AA QID, Disp: , Rfl: 5  •  Lactobacillus TABS, Take by mouth, Disp: , Rfl:   •  lisinopril (ZESTRIL) 20 mg tablet, Take 20 mg by mouth daily  , Disp: , Rfl:   •  pantoprazole (PROTONIX) 40 mg tablet, pantoprazole 40 mg tablet,delayed release, Disp: , Rfl:   •  pyridoxine (B-6) 100 MG tablet, Take 100 mg by mouth, Disp: , Rfl:   •  simvastatin (ZOCOR) 40 mg tablet, Take 40 mg by mouth daily at bedtime  , Disp: , Rfl:   •  sitaGLIPtin (JANUVIA) 25 mg tablet, , Disp: , Rfl:   •  zolpidem (AMBIEN) 5 mg tablet, Take 5 mg by mouth daily at bedtime as needed for sleep , Disp: , Rfl:   •  Zoster Vac Recomb Adjuvanted (SHINGRIX) 50 MCG/0 5ML SUSR, Shingrix (PF) 50 mcg/0 5 mL intramuscular suspension, kit  ADM 0 5ML IM UTD, Disp: , Rfl:   •  Zoster Vaccine Live (ZOSTAVAX) 23985 UNT/0 65ML SUSR, Zostavax (PF) 19,400 unit/0 65 mL subcutaneous suspension, Disp: , Rfl:   Allergies   Allergen Reactions   • Amoxicillin Other (See Comments)     C-diff   • Azithromycin Diarrhea   • Ciprofloxacin Other (See Comments)     Unknown   • Clindamycin    • Metronidazole Other (See Comments)     Nausea and severe headache   • Penicillins    • Promethazine Other (See Comments)     dizziness   • Sulfa Antibiotics    • Bactrim [Sulfamethoxazole-Trimethoprim] Rash       The following portions of the patient's history were reviewed and updated as appropriate: allergies, current medications, past family history, past medical history, past social history, past surgical history and problem list         Vitals:    01/18/23 1406   BP: 110/76   Temp: 98 2 °F (36 8 °C)       Physical Exam  Constitutional:       General: She is not in acute distress  Appearance: Normal appearance  She is well-developed  HENT:      Head: Normocephalic and atraumatic  Cardiovascular:      Heart sounds: Normal heart sounds  Pulmonary:      Breath sounds: Normal breath sounds     Chest:   Breasts:     Right: Skin change ( Mastectomy scar) present  No mass or tenderness  Left: No inverted nipple, mass, nipple discharge, skin change or tenderness  Abdominal:      Palpations: Abdomen is soft  Lymphadenopathy:      Upper Body:      Right upper body: No supraclavicular, axillary or pectoral adenopathy  Left upper body: No supraclavicular, axillary or pectoral adenopathy  Neurological:      Mental Status: She is alert and oriented to person, place, and time  Psychiatric:         Mood and Affect: Mood normal            Results:  Labs:      Imaging  1/11/23 left 3d screening mammogram B1, density 2    I reviewed the above imaging data  Discussion/Summary:83 yo female s/p right mastectomy for recurrent carcinoma  No concerns on exam today  Contralateral mammogram is benign  I will therefore see her again in one year or sooner should the need arise

## 2023-01-20 DIAGNOSIS — Z86.000 PERSONAL HISTORY OF IN-SITU NEOPLASM OF BREAST: Primary | ICD-10-CM

## 2023-05-18 ENCOUNTER — TELEPHONE (OUTPATIENT)
Dept: GYNECOLOGY | Facility: CLINIC | Age: 83
End: 2023-05-18

## 2023-05-18 NOTE — TELEPHONE ENCOUNTER
Pt was last seen 2021 and would now like to follow up with her prolapse  Would you like to follow up or refer to to another provider  Please advise

## 2023-05-19 ENCOUNTER — TELEPHONE (OUTPATIENT)
Dept: GYNECOLOGY | Facility: CLINIC | Age: 83
End: 2023-05-19

## 2023-05-19 DIAGNOSIS — N81.3 UTEROVAGINAL PROLAPSE, COMPLETE: Primary | ICD-10-CM

## 2023-05-19 NOTE — TELEPHONE ENCOUNTER
Pt called stating in 2021 Dr Narendra Giles discussed a prolapse with her and she hadn't follow up with Dr Xochitl Jacobo on it  Ppw was faxed and inner officed to their office to set up a NP appt  To evaluate her uterovaginal prolapse

## 2023-12-14 ENCOUNTER — TELEPHONE (OUTPATIENT)
Dept: HEMATOLOGY ONCOLOGY | Facility: CLINIC | Age: 83
End: 2023-12-14

## 2023-12-14 NOTE — TELEPHONE ENCOUNTER
Appointment Change  Cancel, Reschedule, Change to Virtual      Who are you speaking with? Patient   If it is not the patient, is the caller listed on the communication consent form? N/A   Which provider is the appointment scheduled with? Dr. Mary Bernal   When was the original appointment scheduled? Please list date and time 01/22/24 2:15PM   At which location is the appointment scheduled to take place? [place]   Was the appointment rescheduled? Was the appointment changed from an in person visit to a virtual visit? If so, please list the details of the change. 02/26/24 8:15AM   What is the reason for the appointment change?  Provider

## 2024-01-12 ENCOUNTER — HOSPITAL ENCOUNTER (OUTPATIENT)
Dept: MAMMOGRAPHY | Facility: MEDICAL CENTER | Age: 84
Discharge: HOME | End: 2024-01-12
Payer: MEDICARE

## 2024-01-12 VITALS — BODY MASS INDEX: 24.64 KG/M2 | HEIGHT: 67 IN | WEIGHT: 156.97 LBS

## 2024-01-12 DIAGNOSIS — Z12.31 SCREENING MAMMOGRAM FOR BREAST CANCER: ICD-10-CM

## 2024-01-12 PROCEDURE — 77067 SCR MAMMO BI INCL CAD: CPT

## 2024-01-12 PROCEDURE — 77063 BREAST TOMOSYNTHESIS BI: CPT

## 2024-02-26 ENCOUNTER — OFFICE VISIT (OUTPATIENT)
Dept: SURGICAL ONCOLOGY | Facility: CLINIC | Age: 84
End: 2024-02-26
Payer: MEDICARE

## 2024-02-26 VITALS
WEIGHT: 151 LBS | HEIGHT: 67 IN | RESPIRATION RATE: 16 BRPM | HEART RATE: 73 BPM | DIASTOLIC BLOOD PRESSURE: 80 MMHG | TEMPERATURE: 97.3 F | SYSTOLIC BLOOD PRESSURE: 124 MMHG | OXYGEN SATURATION: 98 % | BODY MASS INDEX: 23.7 KG/M2

## 2024-02-26 DIAGNOSIS — Z12.31 SCREENING MAMMOGRAM FOR BREAST CANCER: ICD-10-CM

## 2024-02-26 DIAGNOSIS — Z86.000 PERSONAL HISTORY OF IN-SITU NEOPLASM OF BREAST: ICD-10-CM

## 2024-02-26 DIAGNOSIS — Z85.3 ENCOUNTER FOR FOLLOW-UP SURVEILLANCE OF BREAST CANCER: Primary | ICD-10-CM

## 2024-02-26 DIAGNOSIS — Z08 ENCOUNTER FOR FOLLOW-UP SURVEILLANCE OF BREAST CANCER: Primary | ICD-10-CM

## 2024-02-26 PROCEDURE — 99213 OFFICE O/P EST LOW 20 MIN: CPT | Performed by: SURGERY

## 2024-02-26 RX ORDER — CLOBETASOL PROPIONATE 0.5 MG/G
CREAM TOPICAL
COMMUNITY

## 2024-02-26 NOTE — PROGRESS NOTES
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  Negative for appetite change, fever and unexpected weight change.   HENT: Negative.  Negative for trouble swallowing.    Eyes: Negative.    Respiratory: Negative.  Negative for cough and shortness of breath.    Cardiovascular: Negative.  Negative for chest pain.   Gastrointestinal: Negative.  Negative for abdominal pain, nausea and vomiting.   Endocrine: Negative.    Genitourinary: Negative.  Negative for dysuria.   Musculoskeletal: Negative.   Negative for arthralgias and myalgias.   Skin: Negative.    Allergic/Immunologic: Negative.    Neurological: Negative.  Negative for headaches.   Hematological: Negative.  Negative for adenopathy. Does not bruise/bleed easily.   Psychiatric/Behavioral: Negative.         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 difficile diarrhea     Chronic kidney disease 2013    CIS (carcinoma in situ of cervix)     Diabetes mellitus (HCC) 2016    Diarrhea     Environmental allergies     Heartburn     High cholesterol     Hypertension     Menopausal symptoms     Nausea and vomiting     Osteopenia     Other microscopic hematuria     Personal history of other malignant neoplasm of kidney     Renal mass     Renal/ureteral disease     Sarcoid     UTI (urinary tract infection)     Wears glasses     Wears glasses      Past Surgical History:   Procedure Laterality Date    AUGMENTATION MAMMAPLASTY  06/24/2016    had to be removed    BREAST BIOPSY  01/01/2019    lumpectomy    BREAST LUMPECTOMY Right 2016    BREAST SURGERY Right     lumpectomy    CERVICAL CONIZATION   W/ LASER      CHOLECYSTECTOMY      COLON SURGERY      COLONOSCOPY      DEEP AXILLARY SENTINEL NODE BIOPSY / EXCISION      FOOT SURGERY      INTRAOPERATIVE RADIATION THERAPY (IORT)  2009    JOINT REPLACEMENT Right     knee    MASTECTOMY Right 2016    PARTIAL NEPHRECTOMY Left     CA MASTECTOMY SIMPLE COMPLETE Right 03/08/2016    Procedure: MASTECTOMY SIMPLE;  Surgeon: Brooke Walls MD;  [facility];  Service: General    CA TISSUE EXPANDER PLACEMENT BREAST RECONSTRUCTION Right 03/08/2016    Procedure: INSERTION/EXPANDER  IMPLANT BREAST ;  Surgeon: Marcelino Estrada MD;  Location: [facility];   Service: Plastics    TOTAL KNEE ARTHROPLASTY Right     UPPER GASTROINTESTINAL ENDOSCOPY  2018     Family History   Problem Relation Age of Onset    Alzheimer's disease Mother     Breast cancer Mother         age dx unk    Kidney failure Father     Prostate cancer Brother     No Known Problems Sister     Breast cancer Maternal Grandmother         Shanna's mother    No Known Problems Maternal Grandfather     No Known Problems Paternal Grandmother     No Known Problems Paternal Grandfather     No Known Problems Maternal Aunt     No Known Problems Maternal Aunt     No Known Problems Maternal Aunt     No Known Problems Maternal Aunt     No Known Problems Maternal Aunt     No Known Problems Maternal Aunt      Social History     Socioeconomic History    Marital status: /Civil Union     Spouse name: Not on file    Number of children: Not on file    Years of education: Not on file    Highest education level: Not on file   Occupational History    Not on file   Tobacco Use    Smoking status: Former     Current packs/day: 0.00     Average packs/day: 0.3 packs/day for 5.0 years (1.3 ttl pk-yrs)     Types: Cigarettes     Start date: 1970     Quit date: 1975     Years since quittin.0    Smokeless tobacco: Never    Tobacco comments:     smoked lighly 55 years ago   Substance and Sexual Activity    Alcohol use: Yes     Alcohol/week: 1.0 standard drink of alcohol     Types: 1 Glasses of wine per week    Drug use: No    Sexual activity: Not Currently     Partners: Male     Birth control/protection: Post-menopausal   Other Topics Concern    Not on file   Social History Narrative    Not on file     Social Determinants of Health     Financial Resource Strain: Not on file   Food Insecurity: Not on file   Transportation Needs: Not on file   Physical Activity: Not on file   Stress: Not on file   Social Connections: Not on file   Intimate Partner Violence: Not on file   Housing Stability: Not on file       Current  Outpatient Medications:     ALPRAZolam (XANAX) 0.5 mg tablet, alprazolam 0.5 mg tablet, Disp: , Rfl:     cetirizine (ZyrTEC) 10 mg tablet, Take 10 mg by mouth daily., Disp: , Rfl:     Cholecalciferol (Vitamin D3) 50 MCG (2000 UT) CHEW, , Disp: , Rfl:     CHOLESTYRAMINE PO, cholestyramine (with sugar) 4 gram powder for susp in a packet, Disp: , Rfl:     clobetasol (TEMOVATE) 0.05 % cream, APPLY IN A VERY THIN LAYER EXTERNALLY TO THE AFFECTED AREA ONCE A WEEK AS DIRECTED, Disp: , Rfl:     diclofenac sodium (VOLTAREN) 1 %, CATRACHITA 2 GRAMS EXT AA QID, Disp: , Rfl: 5    Lactobacillus TABS, Take by mouth, Disp: , Rfl:     lisinopril (ZESTRIL) 20 mg tablet, Take 30 mg by mouth daily, Disp: , Rfl:     pantoprazole (PROTONIX) 40 mg tablet, pantoprazole 40 mg tablet,delayed release, Disp: , Rfl:     pyridoxine (B-6) 100 MG tablet, Take 100 mg by mouth, Disp: , Rfl:     simvastatin (ZOCOR) 40 mg tablet, Take 40 mg by mouth daily at bedtime., Disp: , Rfl:     sitaGLIPtin (JANUVIA) 25 mg tablet, , Disp: , Rfl:     zolpidem (AMBIEN) 5 mg tablet, Take 5 mg by mouth daily at bedtime as needed for sleep., Disp: , Rfl:     Zoster Vac Recomb Adjuvanted (SHINGRIX) 50 MCG/0.5ML SUSR, Shingrix (PF) 50 mcg/0.5 mL intramuscular suspension, kit  ADM 0.5ML IM UTD (Patient not taking: Reported on 2/26/2024), Disp: , Rfl:     Zoster Vaccine Live (ZOSTAVAX) 89306 UNT/0.65ML SUSR, Zostavax (PF) 19,400 unit/0.65 mL subcutaneous suspension (Patient not taking: Reported on 2/26/2024), Disp: , Rfl:   Allergies   Allergen Reactions    Amoxicillin Other (See Comments)     C-diff    Azithromycin Diarrhea    Ciprofloxacin Other (See Comments)     Unknown    Clindamycin     Metronidazole Other (See Comments)     Nausea and severe headache    Penicillins     Promethazine Other (See Comments)     dizziness    Sulfa Antibiotics     Bactrim [Sulfamethoxazole-Trimethoprim] Rash       The following portions of the patient's history were reviewed and updated as  appropriate: allergies, current medications, past family history, past medical history, past social history, past surgical history, and problem list.        Vitals:    02/26/24 0815   BP: 124/80   Pulse: 73   Resp: 16   Temp: (!) 97.3 °F (36.3 °C)   SpO2: 98%       Physical Exam  Constitutional:       General: She is not in acute distress.     Appearance: Normal appearance. She is well-developed.   HENT:      Head: Normocephalic and atraumatic.   Cardiovascular:      Heart sounds: Normal heart sounds.   Pulmonary:      Breath sounds: Normal breath sounds.   Chest:   Breasts:     Right: Skin change (mastectomy scar) present. No swelling, bleeding, mass or tenderness.      Left: No swelling, bleeding, inverted nipple, mass, nipple discharge, skin change or tenderness.   Abdominal:      Palpations: Abdomen is soft.   Musculoskeletal:      Right lower leg: No edema.      Left lower leg: No edema.   Lymphadenopathy:      Upper Body:      Right upper body: No supraclavicular, axillary or pectoral adenopathy.      Left upper body: No supraclavicular, axillary or pectoral adenopathy.   Neurological:      Mental Status: She is alert and oriented to person, place, and time.   Psychiatric:         Mood and Affect: Mood normal.           Results:  Labs:      Imaging  1/12/24 left 3d screening mammogram was benign    I reviewed the above imaging data.    Discussion/Summary: 83-year-old female with a history of DCIS of the right breast.  She initially had breast conservation followed by a completion mastectomy with expander placement.  The expander was subsequently removed.  There is no evidence of disease based on exam today.  Her recent mammogram was benign.  I will make arrangements for her mammogram and exam in 1 year.  I advised her to return sooner should the need arise.

## 2025-01-13 ENCOUNTER — HOSPITAL ENCOUNTER (OUTPATIENT)
Dept: MAMMOGRAPHY | Facility: MEDICAL CENTER | Age: 85
Discharge: HOME | End: 2025-01-13
Payer: MEDICARE

## 2025-01-13 VITALS — BODY MASS INDEX: 23.7 KG/M2 | WEIGHT: 151 LBS | HEIGHT: 67 IN

## 2025-01-13 DIAGNOSIS — Z12.31 SCREENING MAMMOGRAM FOR BREAST CANCER: ICD-10-CM

## 2025-01-13 PROCEDURE — 77063 BREAST TOMOSYNTHESIS BI: CPT

## 2025-01-13 PROCEDURE — 77067 SCR MAMMO BI INCL CAD: CPT

## 2025-03-26 ENCOUNTER — OFFICE VISIT (OUTPATIENT)
Dept: SURGICAL ONCOLOGY | Facility: CLINIC | Age: 85
End: 2025-03-26
Payer: MEDICARE

## 2025-03-26 VITALS
HEART RATE: 51 BPM | OXYGEN SATURATION: 90 % | HEIGHT: 67 IN | BODY MASS INDEX: 23.65 KG/M2 | SYSTOLIC BLOOD PRESSURE: 132 MMHG | DIASTOLIC BLOOD PRESSURE: 84 MMHG | TEMPERATURE: 97.1 F

## 2025-03-26 DIAGNOSIS — Z12.31 SCREENING MAMMOGRAM FOR BREAST CANCER: ICD-10-CM

## 2025-03-26 DIAGNOSIS — Z90.11 H/O RIGHT MASTECTOMY: Primary | ICD-10-CM

## 2025-03-26 DIAGNOSIS — Z85.3 ENCOUNTER FOR FOLLOW-UP SURVEILLANCE OF BREAST CANCER: Primary | ICD-10-CM

## 2025-03-26 DIAGNOSIS — Z86.000 PERSONAL HISTORY OF IN-SITU NEOPLASM OF BREAST: ICD-10-CM

## 2025-03-26 DIAGNOSIS — Z08 ENCOUNTER FOR FOLLOW-UP SURVEILLANCE OF BREAST CANCER: Primary | ICD-10-CM

## 2025-03-26 PROCEDURE — 99213 OFFICE O/P EST LOW 20 MIN: CPT | Performed by: SURGERY

## 2025-03-26 RX ORDER — LISINOPRIL 30 MG/1
TABLET ORAL
COMMUNITY
Start: 2025-02-11

## 2025-03-26 NOTE — PROGRESS NOTES
Name: Shanna Rebolledo      : 1940      MRN: [id number]  Encounter Provider: Brooke Walls MD  Encounter Date: 3/26/2025   Encounter department: 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  She had a subsequent mastectomy with reconstruction but lost her expander.  There is no evidence of disease based on exam today.  Her recent contralateral mammogram was benign.  I will make arrangements for a left-sided mammogram for next year.  I will see her again in 1 year for an exam or sooner should the need arise.      History of Present Illness   Shanna Rebolledo is a 84 y.o. year old female who presents for a breast cancer follow-up visit.  She denies any current breast referable concerns.  She had a recent benign mammogram.     Oncology History   Cancer Staging   No matching staging information was found for the patient.  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  Negative for appetite change, fever and unexpected weight change.   HENT: Negative.  Negative for trouble swallowing.    Eyes: Negative.    Respiratory: Negative.  Negative for cough and shortness of breath.    Cardiovascular: Negative.  Negative for chest pain.   Gastrointestinal: Negative.  Negative for  abdominal pain, nausea and vomiting.   Endocrine: Negative.    Genitourinary: Negative.  Negative for dysuria.   Musculoskeletal: Negative.  Negative for arthralgias and myalgias.   Skin: Negative.    Allergic/Immunologic: Negative.    Neurological: Negative.  Negative for headaches.   Hematological: Negative.  Negative for adenopathy. Does not bruise/bleed easily.   Psychiatric/Behavioral: Negative.      A complete review of systems is negative other than that noted above in the HPI.    Past Medical History   Past Medical History:   Diagnosis Date    Acid reflux     Allergic rhinitis     C. difficile diarrhea     Chronic kidney disease 2013    CIS (carcinoma in situ of cervix)     Diabetes mellitus (HCC) 2016    Diarrhea     Environmental allergies     Heartburn     High cholesterol     Hypertension     Menopausal symptoms     Nausea and vomiting     Osteopenia     Other microscopic hematuria     Personal history of other malignant neoplasm of kidney     Renal mass     Renal/ureteral disease     Sarcoid     UTI (urinary tract infection)     Wears glasses     Wears glasses      Past Surgical History:   Procedure Laterality Date    AUGMENTATION MAMMAPLASTY  06/24/2016    had to be removed    BREAST BIOPSY  01/01/2019    lumpectomy    BREAST LUMPECTOMY Right 2016    BREAST SURGERY Right     lumpectomy    CERVICAL CONIZATION   W/ LASER      CHOLECYSTECTOMY      COLON SURGERY      COLONOSCOPY      DEEP AXILLARY SENTINEL NODE BIOPSY / EXCISION      FOOT SURGERY      INTRAOPERATIVE RADIATION THERAPY (IORT)  2009    JOINT REPLACEMENT Right     knee    MASTECTOMY Right 2016    PARTIAL NEPHRECTOMY Left     MO MASTECTOMY SIMPLE COMPLETE Right 03/08/2016    Procedure: MASTECTOMY SIMPLE;  Surgeon: Brooke Walls MD;  [facility];  Service: General    MO TISSUE EXPANDER PLACEMENT BREAST RECONSTRUCTION Right 03/08/2016    Procedure: INSERTION/EXPANDER  IMPLANT BREAST ;  Surgeon: Marcelino Estrada MD;  [facility];   Service: Plastics    TOTAL KNEE ARTHROPLASTY Right     UPPER GASTROINTESTINAL ENDOSCOPY  2018     Family History   Problem Relation Age of Onset    Alzheimer's disease Mother     Breast cancer Mother         age dx unk    Kidney failure Father     Prostate cancer Brother     No Known Problems Sister     Breast cancer Maternal Grandmother         Shanna's mother    No Known Problems Maternal Grandfather     No Known Problems Paternal Grandmother     No Known Problems Paternal Grandfather     No Known Problems Maternal Aunt     No Known Problems Maternal Aunt     No Known Problems Maternal Aunt     No Known Problems Maternal Aunt     No Known Problems Maternal Aunt     No Known Problems Maternal Aunt       reports that she quit smoking about 50 years ago. Her smoking use included cigarettes. She started smoking about 55 years ago. She has a 1.3 pack-year smoking history. She has never used smokeless tobacco. She reports current alcohol use of about 1.0 standard drink of alcohol per week. She reports that she does not use drugs.  Current Outpatient Medications   Medication Instructions    ALPRAZolam (XANAX) 0.5 mg tablet alprazolam 0.5 mg tablet    cetirizine (ZYRTEC) 10 mg, Daily    Cholecalciferol (Vitamin D3) 50 MCG (2000 UT) CHEW No dose, route, or frequency recorded.    CHOLESTYRAMINE PO cholestyramine (with sugar) 4 gram powder for susp in a packet    diclofenac sodium (VOLTAREN) 1 % CATRACHITA 2 GRAMS EXT AA QID    Lactobacillus TABS Take by mouth    lisinopril (ZESTRIL) 30 mg tablet     pantoprazole (PROTONIX) 40 mg tablet pantoprazole 40 mg tablet,delayed release    pyridoxine (B-6) 100 mg    simvastatin (ZOCOR) 40 mg, Daily at bedtime    sitaGLIPtin (JANUVIA) 25 mg tablet No dose, route, or frequency recorded.    zolpidem (AMBIEN) 5 mg, Daily at bedtime PRN    Zoster Vac Recomb Adjuvanted (SHINGRIX) 50 MCG/0.5ML SUSR Shingrix (PF) 50 mcg/0.5 mL intramuscular suspension, kit   ADM 0.5ML IM UTD    Zoster Vaccine Live  "(ZOSTAVAX) 86049 UNT/0.65ML SUSR Zostavax (PF) 19,400 unit/0.65 mL subcutaneous suspension     Allergies   Allergen Reactions    Amoxicillin Other (See Comments)     C-diff    Azithromycin Diarrhea    Ciprofloxacin Other (See Comments)     Unknown    Clindamycin     Metronidazole Other (See Comments)     Nausea and severe headache    Penicillins     Promethazine Other (See Comments)     dizziness    Sulfa Antibiotics     Bactrim [Sulfamethoxazole-Trimethoprim] Rash           Objective   /84 (BP Location: Left arm, Patient Position: Sitting, Cuff Size: Standard)   Pulse (!) 51   Temp (!) 97.1 °F (36.2 °C) (Temporal)   Ht 5' 7\" (1.702 m)   SpO2 90%   BMI 23.65 kg/m²     Pain Screening:  Pain Score: 0-No pain  ECOG    Physical Exam  Constitutional:       General: She is not in acute distress.     Appearance: Normal appearance. She is well-developed.   HENT:      Head: Normocephalic and atraumatic.   Cardiovascular:      Heart sounds: Normal heart sounds.   Pulmonary:      Breath sounds: Normal breath sounds.   Chest:   Breasts:     Right: Skin change (Mastectomy scar) present. No swelling, bleeding, mass or tenderness.      Left: No swelling, bleeding, inverted nipple, mass, nipple discharge, skin change or tenderness.   Abdominal:      Palpations: Abdomen is soft.   Musculoskeletal:      Right lower leg: No edema.      Left lower leg: No edema.   Lymphadenopathy:      Upper Body:      Right upper body: No supraclavicular, axillary or pectoral adenopathy.      Left upper body: No supraclavicular, axillary or pectoral adenopathy.   Neurological:      Mental Status: She is alert and oriented to person, place, and time.   Psychiatric:         Mood and Affect: Mood normal.          Labs: I have reviewed pertinent labs.     No visits with results within 1 Month(s) from this visit.   Latest known visit with results is:   Office Visit on 11/24/2021   Component Date Value Ref Range Status     PENNY,UA 11/24/2021 " yellow   Final    CLARITY,UA 11/24/2021 clear   Final    SPECIFIC GRAVITY,UA 11/24/2021 1.015   Final     PH,UA 11/24/2021 7.0   Final    LEUKOCYTE ESTERASE,UA 11/24/2021 small   Final    NITRITE,UA 11/24/2021 neg   Final    GLUCOSE, UA 11/24/2021 neg   Final    KETONES,UA 11/24/2021 neg   Final    BILIRUBIN,UA 11/24/2021 neg   Final    BLOOD,UA 11/24/2021 neg   Final    POCT URINE PROTEIN 11/24/2021 neg   Final    SL AMB POCT UROBILINOGEN 11/24/2021 0.2   Final    POST-VOID RESIDUAL VOLUME, ML POC 11/24/2021 143  mL Final     Pathology: I have reviewed pathology reports described above.    Radiology Results Review: I personally reviewed the following image studies in PACS and associated radiology reports: 1/13/2025 left 3D screening mammogram. My interpretation of the radiology images/reports is: No evidence of malignancy.

## 2025-05-15 ENCOUNTER — OFFICE VISIT (OUTPATIENT)
Dept: GYNECOLOGY | Facility: CLINIC | Age: 85
End: 2025-05-15
Payer: MEDICARE

## 2025-05-15 VITALS
DIASTOLIC BLOOD PRESSURE: 78 MMHG | HEIGHT: 67 IN | HEART RATE: 88 BPM | SYSTOLIC BLOOD PRESSURE: 148 MMHG | BODY MASS INDEX: 23.64 KG/M2 | WEIGHT: 150.6 LBS

## 2025-05-15 DIAGNOSIS — L90.0 LICHEN SCLEROSUS ET ATROPHICUS: Primary | ICD-10-CM

## 2025-05-15 DIAGNOSIS — N95.2 ATROPHIC VAGINITIS: ICD-10-CM

## 2025-05-15 DIAGNOSIS — E11.9 TYPE 2 DIABETES MELLITUS WITHOUT COMPLICATION, WITHOUT LONG-TERM CURRENT USE OF INSULIN (HCC): ICD-10-CM

## 2025-05-15 DIAGNOSIS — N81.4 UTEROVAGINAL PROLAPSE, UNSPECIFIED: ICD-10-CM

## 2025-05-15 PROCEDURE — 99203 OFFICE O/P NEW LOW 30 MIN: CPT | Performed by: OBSTETRICS & GYNECOLOGY

## 2025-05-15 RX ORDER — DESOXIMETASONE 0.5 MG/G
CREAM TOPICAL 2 TIMES DAILY
Qty: 60 G | Refills: 1 | Status: SHIPPED | OUTPATIENT
Start: 2025-05-15

## 2025-05-15 NOTE — PROGRESS NOTES
Name: Shanna Rebolledo      : 1940      MRN: [id number]  Encounter Provider: Juancho Murray DO  Encounter Date: 5/15/2025   Encounter department: [facility]  :  Assessment & Plan  Lichen sclerosus et atrophicus  Will apply Topicort twice daily for 1 to 2 weeks.  She will return to the office in 3 weeks for repeat exam  Orders:    desoximetasone (TOPICORT) 0.05 % cream; Apply topically 2 (two) times a day    Atrophic vaginitis         Uterovaginal prolapse, unspecified         Type 2 diabetes mellitus without complication, without long-term current use of insulin ([facility])    Lab Results   Component Value Date    HGBA1C 7.4 (H) 2025                History of Present Illness   HPI  Shanna Rebolledo is a 84 y.o. female who presents complaining of significant vaginal itching.  She has had this for the past 2 years.  She was last seen in our office in 2021 secondary to uterovaginal prolapse.  She was referred to urogynecology.  Their exam revealed and confirmed uterovaginal prolapse.  Options were discussed with her and she had opted to follow-up.  She was also noted to have lichen sclerosis.  She was placed on clobetasol cream.  She has not used that cream recently.  She denies any vaginal discharge or bleeding.  Denies any urinary incontinence.  She denies any need for stenting to void.  Patient does have a history of breast cancer, carcinoma in situ of the cervix and renal carcinoma      Review of Systems  Past Medical History   Past Medical History:   Diagnosis Date    Abnormal Pap smear of cervix     years ago    Acid reflux     Allergic rhinitis     Breast cancer ([facility]) mastectomy 2016    C. difficile diarrhea     Chronic kidney disease     CIS (carcinoma in situ of cervix)     Diabetes mellitus ([facility]) 2016    Diarrhea     Environmental allergies     Heartburn     High cholesterol     Hypertension     Menopausal symptoms     Menopause ovarian failure  1990    Nausea and vomiting     Osteopenia     Other microscopic hematuria     Personal history of other malignant neoplasm of kidney     Renal mass     Renal/ureteral disease     Sarcoid     UTI (urinary tract infection)     Wears glasses     Wears glasses      Past Surgical History:   Procedure Laterality Date    AUGMENTATION MAMMAPLASTY  06/24/2016    had to be removed    BREAST BIOPSY  01/01/2019    lumpectomy    BREAST CYST EXCISION  01/01/2009    before lumpectomy    BREAST LUMPECTOMY Right 2016    BREAST SURGERY Right     lumpectomy    CERVICAL CONIZATION   W/ LASER      CHOLECYSTECTOMY      COLON SURGERY      COLONOSCOPY      DEEP AXILLARY SENTINEL NODE BIOPSY / EXCISION      FOOT SURGERY      INTRAOPERATIVE RADIATION THERAPY (IORT)  2009    JOINT REPLACEMENT Right     knee    MASTECTOMY Right 2016    PARTIAL NEPHRECTOMY Left     DE MASTECTOMY SIMPLE COMPLETE Right 03/08/2016    Procedure: MASTECTOMY SIMPLE;  Surgeon: Brooke Walls MD;  Location: [facility];  Service: General    DE TISSUE EXPANDER PLACEMENT BREAST RECONSTRUCTION Right 03/08/2016    Procedure: INSERTION/EXPANDER  IMPLANT BREAST ;  Surgeon: Marcelino Estrada MD;  Location: [facility];  Service: Plastics    TOTAL KNEE ARTHROPLASTY Right     TUBAL LIGATION  1972 ?    UPPER GASTROINTESTINAL ENDOSCOPY  2018     Family History   Problem Relation Age of Onset    Alzheimer's disease Mother     Breast cancer Mother         Samantha (mother)    Lung disease Mother         Sarcodosis    Kidney failure Father     Prostate cancer Brother     No Known Problems Sister     Breast cancer Maternal Grandmother         Shanna's mother    Diabetes Maternal Grandmother         Shanna's mother    No Known Problems Maternal Grandfather     No Known Problems Paternal Grandmother     No Known Problems Paternal Grandfather     No Known Problems Maternal Aunt     No Known Problems Maternal Aunt     No Known Problems Maternal Aunt     No Known Problems Maternal Aunt     No Known  Problems Maternal Aunt     No Known Problems Maternal Aunt     Asthma Son     Asthma Son       reports that she quit smoking about 50 years ago. Her smoking use included cigarettes. She started smoking about 55 years ago. She has a 1.3 pack-year smoking history. She has never used smokeless tobacco. She reports current alcohol use of about 1.0 standard drink of alcohol per week. She reports that she does not use drugs.  Current Outpatient Medications   Medication Instructions    ALPRAZolam (XANAX) 0.5 mg tablet     cetirizine (ZYRTEC) 10 mg, Daily    Cholecalciferol (Vitamin D3) 50 MCG ( UT) CHEW No dose, route, or frequency recorded.    CHOLESTYRAMINE PO cholestyramine (with sugar) 4 gram powder for susp in a packet    diclofenac sodium (VOLTAREN) 1 %     Lactobacillus TABS Take by mouth    lisinopril (ZESTRIL) 30 mg tablet     pantoprazole (PROTONIX) 40 mg tablet     pyridoxine (B-6) 100 mg    simvastatin (ZOCOR) 40 mg, Daily at bedtime    sitaGLIPtin (JANUVIA) 25 mg tablet No dose, route, or frequency recorded.    zolpidem (AMBIEN) 5 mg, Daily at bedtime PRN    Zoster Vac Recomb Adjuvanted (SHINGRIX) 50 MCG/0.5ML SUSR Shingrix (PF) 50 mcg/0.5 mL intramuscular suspension, kit   ADM 0.5ML IM UTD    Zoster Vaccine Live (ZOSTAVAX) 59406 UNT/0.65ML SUSR Zostavax (PF) 19,400 unit/0.65 mL subcutaneous suspension   Allergies[1]   Medications Ordered Prior to Encounter[2]   Social History     Tobacco Use    Smoking status: Former     Current packs/day: 0.00     Average packs/day: 0.3 packs/day for 5.2 years (1.3 ttl pk-yrs)     Types: Cigarettes     Start date: 1970     Quit date: 1975     Years since quittin.2    Smokeless tobacco: Never    Tobacco comments:     smoked lighly 55 yearw ago   Vaping Use    Vaping status: Never Used   Substance and Sexual Activity    Alcohol use: Yes     Alcohol/week: 1.0 standard drink of alcohol     Types: 1 Glasses of wine per week    Drug use: No    Sexual activity:  "Not Currently     Partners: Male     Birth control/protection: Post-menopausal        Objective   /78 (BP Location: Left arm, Patient Position: Sitting, Cuff Size: Standard)   Pulse 88   Ht 5' 7\" (1.702 m)   Wt 68.3 kg (150 lb 9.6 oz)   BMI 23.59 kg/m²      Physical Exam  Vitals reviewed.   Abdominal:      General: There is no distension.      Palpations: Abdomen is soft. There is no mass.      Tenderness: There is no abdominal tenderness. There is no guarding or rebound.      Hernia: No hernia is present. There is no hernia in the left inguinal area or right inguinal area.   Genitourinary:     Comments: Stage III uterine prolapse.  Whitish discoloration of labia labia minora bilaterally consistent with lichen sclerosis.  Atrophic vaginal changes.  Uterus is nontender.  No adnexal masses or tenderness.  Cervix appears normal.  Lymphadenopathy:      Lower Body: No right inguinal adenopathy. No left inguinal adenopathy.     Neurological:      Mental Status: She is alert.                [1]   Allergies  Allergen Reactions    Amoxicillin Other (See Comments)     C-diff    Azithromycin Diarrhea    Ciprofloxacin Other (See Comments)     Unknown    Clindamycin     Metronidazole Other (See Comments)     Nausea and severe headache    Penicillins     Promethazine Other (See Comments)     dizziness    Sulfa Antibiotics     Bactrim [Sulfamethoxazole-Trimethoprim] Rash   [2]   Current Outpatient Medications on File Prior to Visit   Medication Sig Dispense Refill    ALPRAZolam (XANAX) 0.5 mg tablet       cetirizine (ZyrTEC) 10 mg tablet Take 10 mg by mouth in the morning.      Cholecalciferol (Vitamin D3) 50 MCG (2000 UT) CHEW       diclofenac sodium (VOLTAREN) 1 %   5    Lactobacillus TABS Take by mouth      lisinopril (ZESTRIL) 30 mg tablet       pantoprazole (PROTONIX) 40 mg tablet       pyridoxine (B-6) 100 MG tablet Take 100 mg by mouth      simvastatin (ZOCOR) 40 mg tablet Take 40 mg by mouth daily at bedtime      " sitaGLIPtin (JANUVIA) 25 mg tablet       zolpidem (AMBIEN) 5 mg tablet Take 5 mg by mouth daily at bedtime as needed for sleep      CHOLESTYRAMINE PO cholestyramine (with sugar) 4 gram powder for susp in a packet (Patient not taking: Reported on 5/15/2025)      Zoster Vac Recomb Adjuvanted (SHINGRIX) 50 MCG/0.5ML SUSR Shingrix (PF) 50 mcg/0.5 mL intramuscular suspension, kit   ADM 0.5ML IM UTD (Patient not taking: Reported on 2/26/2024)      Zoster Vaccine Live (ZOSTAVAX) 76948 UNT/0.65ML SUSR Zostavax (PF) 19,400 unit/0.65 mL subcutaneous suspension (Patient not taking: Reported on 2/26/2024)       No current facility-administered medications on file prior to visit.

## 2025-05-19 ENCOUNTER — TELEPHONE (OUTPATIENT)
Age: 85
End: 2025-05-19

## 2025-05-19 NOTE — TELEPHONE ENCOUNTER
Patient called needing prior authorization on the desoximetasone (topicort) 0.05% cream that Dr. Murray prescribed on 5/15/25.

## 2025-05-19 NOTE — TELEPHONE ENCOUNTER
PA for Desoximetasone (TOPICORT) 0.05 % cream SUBMITTED to [employer]    via    []CM-KEY:   [x][employer]-Case ID #: [id number]   []Availity-Auth ID #   []Faxed to plan   []Other website   []Phone call Case ID #     []PA sent as URGENT    All office notes, labs and other pertaining documents and studies sent. Clinical questions answered. Awaiting determination from insurance company.     Turnaround time for your insurance to make a decision on your Prior Authorization can take 7-21 business days.

## 2025-05-20 NOTE — TELEPHONE ENCOUNTER
PA for Desoximetasone (TOPICORT) 0.05 % cream  APPROVED     Date(s) approved 4/19/2024-5/19/2026        Case #    Patient advised by          [x]MyChart Message  []Phone call   []LMOM  []L/M to call office as no active Communication consent on file  []Unable to leave detailed message as VM not approved on Communication consent       Pharmacy advised by    [x]Fax  []Phone call  []Secure Chat    Specialty Pharmacy    []     Approval letter scanned into [employer] No

## 2025-05-20 NOTE — TELEPHONE ENCOUNTER
Patient called to follow up on if medication prior authorization was approved. Let patient know it had been approved this morning patient was going to follow up with pharmacy.

## 2025-06-19 ENCOUNTER — OFFICE VISIT (OUTPATIENT)
Dept: GYNECOLOGY | Facility: CLINIC | Age: 85
End: 2025-06-19
Payer: MEDICARE

## 2025-06-19 VITALS
SYSTOLIC BLOOD PRESSURE: 130 MMHG | DIASTOLIC BLOOD PRESSURE: 80 MMHG | WEIGHT: 145.6 LBS | BODY MASS INDEX: 22.8 KG/M2 | HEART RATE: 89 BPM

## 2025-06-19 DIAGNOSIS — L90.0 LICHEN SCLEROSUS ET ATROPHICUS: Primary | ICD-10-CM

## 2025-06-19 PROCEDURE — 99213 OFFICE O/P EST LOW 20 MIN: CPT | Performed by: OBSTETRICS & GYNECOLOGY

## 2025-06-19 NOTE — PROGRESS NOTES
:  Assessment & Plan  Lichen sclerosus et atrophicus  Patient is aware that this is a chronic condition and may intermittently flare.  If she has another flare she will reuse a Topicort twice daily for up to 2 weeks.  If no improvement then she will need to return to the office           History of Present Illness     Shanna Rebolledo is a 84 y.o. female     HPI patient presents to the office for recheck.  She was seen in the office on May 15 complaining of significant vaginal itching.  She stated she had had this for the past 2 years.  Prior to that her last visit in the office was in August 2021 secondary to uterovaginal prolapse.  She had been referred to urogynecology.  Their exam revealed and confirmed uterovaginal prolapse.  Options were discussed at that time.  She opted to follow-up.  When seen in the office on the 15th she was noted to have a flare of lichen sclerosis.  She was prescribed Topicort to be applied twice daily up to 2 weeks.  She has seen a significant improvement in the itching.  Review of Systems  Objective   /80 (BP Location: Left arm, Patient Position: Sitting, Cuff Size: Standard)   Pulse 89   Wt 66 kg (145 lb 9.6 oz)   BMI 22.80 kg/m²      Physical Exam  Vitals reviewed.   Abdominal:      Palpations: Abdomen is soft.      Hernia: There is no hernia in the left inguinal area or right inguinal area.   Genitourinary:     General: Normal vulva.      Labia:         Right: No rash, tenderness or lesion.         Left: No rash, tenderness or lesion.       Comments: Significant improvement since prior visit.  Lymphadenopathy:      Lower Body: No right inguinal adenopathy. No left inguinal adenopathy.     Neurological:      Mental Status: She is alert.